# Patient Record
Sex: MALE | Race: OTHER | NOT HISPANIC OR LATINO | URBAN - METROPOLITAN AREA
[De-identification: names, ages, dates, MRNs, and addresses within clinical notes are randomized per-mention and may not be internally consistent; named-entity substitution may affect disease eponyms.]

---

## 2018-06-09 ENCOUNTER — INPATIENT (INPATIENT)
Facility: HOSPITAL | Age: 28
LOS: 5 days | Discharge: HOME | End: 2018-06-15
Attending: PSYCHIATRY & NEUROLOGY | Admitting: PSYCHIATRY & NEUROLOGY
Payer: MEDICAID

## 2018-06-09 VITALS
SYSTOLIC BLOOD PRESSURE: 117 MMHG | OXYGEN SATURATION: 100 % | TEMPERATURE: 98 F | DIASTOLIC BLOOD PRESSURE: 77 MMHG | HEART RATE: 92 BPM | RESPIRATION RATE: 18 BRPM

## 2018-06-09 DIAGNOSIS — F29 UNSPECIFIED PSYCHOSIS NOT DUE TO A SUBSTANCE OR KNOWN PHYSIOLOGICAL CONDITION: ICD-10-CM

## 2018-06-09 DIAGNOSIS — F84.0 AUTISTIC DISORDER: ICD-10-CM

## 2018-06-09 LAB
ALBUMIN SERPL ELPH-MCNC: 4.9 G/DL — SIGNIFICANT CHANGE UP (ref 3.3–5)
ALP SERPL-CCNC: 106 U/L — SIGNIFICANT CHANGE UP (ref 40–120)
ALT FLD-CCNC: 11 U/L — SIGNIFICANT CHANGE UP (ref 4–41)
AMPHET UR-MCNC: NEGATIVE — SIGNIFICANT CHANGE UP
APAP SERPL-MCNC: < 15 UG/ML — LOW (ref 15–25)
APPEARANCE UR: CLEAR — SIGNIFICANT CHANGE UP
AST SERPL-CCNC: 14 U/L — SIGNIFICANT CHANGE UP (ref 4–40)
BARBITURATES UR SCN-MCNC: NEGATIVE — SIGNIFICANT CHANGE UP
BASOPHILS # BLD AUTO: 0.04 K/UL — SIGNIFICANT CHANGE UP (ref 0–0.2)
BASOPHILS NFR BLD AUTO: 0.5 % — SIGNIFICANT CHANGE UP (ref 0–2)
BENZODIAZ UR-MCNC: NEGATIVE — SIGNIFICANT CHANGE UP
BILIRUB SERPL-MCNC: 0.6 MG/DL — SIGNIFICANT CHANGE UP (ref 0.2–1.2)
BILIRUB UR-MCNC: NEGATIVE — SIGNIFICANT CHANGE UP
BLOOD UR QL VISUAL: HIGH
BUN SERPL-MCNC: 10 MG/DL — SIGNIFICANT CHANGE UP (ref 7–23)
CALCIUM SERPL-MCNC: 9.5 MG/DL — SIGNIFICANT CHANGE UP (ref 8.4–10.5)
CANNABINOIDS UR-MCNC: NEGATIVE — SIGNIFICANT CHANGE UP
CHLORIDE SERPL-SCNC: 102 MMOL/L — SIGNIFICANT CHANGE UP (ref 98–107)
CO2 SERPL-SCNC: 25 MMOL/L — SIGNIFICANT CHANGE UP (ref 22–31)
COCAINE METAB.OTHER UR-MCNC: NEGATIVE — SIGNIFICANT CHANGE UP
COLOR SPEC: YELLOW — SIGNIFICANT CHANGE UP
CREAT SERPL-MCNC: 0.87 MG/DL — SIGNIFICANT CHANGE UP (ref 0.5–1.3)
EOSINOPHIL # BLD AUTO: 0.04 K/UL — SIGNIFICANT CHANGE UP (ref 0–0.5)
EOSINOPHIL NFR BLD AUTO: 0.5 % — SIGNIFICANT CHANGE UP (ref 0–6)
ETHANOL BLD-MCNC: < 10 MG/DL — SIGNIFICANT CHANGE UP
GLUCOSE SERPL-MCNC: 104 MG/DL — HIGH (ref 70–99)
GLUCOSE UR-MCNC: NEGATIVE — SIGNIFICANT CHANGE UP
HCT VFR BLD CALC: 44.1 % — SIGNIFICANT CHANGE UP (ref 39–50)
HGB BLD-MCNC: 15.2 G/DL — SIGNIFICANT CHANGE UP (ref 13–17)
IMM GRANULOCYTES # BLD AUTO: 0.02 # — SIGNIFICANT CHANGE UP
IMM GRANULOCYTES NFR BLD AUTO: 0.3 % — SIGNIFICANT CHANGE UP (ref 0–1.5)
KETONES UR-MCNC: NEGATIVE — SIGNIFICANT CHANGE UP
LEUKOCYTE ESTERASE UR-ACNC: NEGATIVE — SIGNIFICANT CHANGE UP
LYMPHOCYTES # BLD AUTO: 1.58 K/UL — SIGNIFICANT CHANGE UP (ref 1–3.3)
LYMPHOCYTES # BLD AUTO: 21.6 % — SIGNIFICANT CHANGE UP (ref 13–44)
MCHC RBC-ENTMCNC: 29.5 PG — SIGNIFICANT CHANGE UP (ref 27–34)
MCHC RBC-ENTMCNC: 34.5 % — SIGNIFICANT CHANGE UP (ref 32–36)
MCV RBC AUTO: 85.6 FL — SIGNIFICANT CHANGE UP (ref 80–100)
METHADONE UR-MCNC: NEGATIVE — SIGNIFICANT CHANGE UP
MONOCYTES # BLD AUTO: 0.39 K/UL — SIGNIFICANT CHANGE UP (ref 0–0.9)
MONOCYTES NFR BLD AUTO: 5.3 % — SIGNIFICANT CHANGE UP (ref 2–14)
MUCOUS THREADS # UR AUTO: SIGNIFICANT CHANGE UP
NEUTROPHILS # BLD AUTO: 5.26 K/UL — SIGNIFICANT CHANGE UP (ref 1.8–7.4)
NEUTROPHILS NFR BLD AUTO: 71.8 % — SIGNIFICANT CHANGE UP (ref 43–77)
NITRITE UR-MCNC: NEGATIVE — SIGNIFICANT CHANGE UP
NRBC # FLD: 0 — SIGNIFICANT CHANGE UP
OPIATES UR-MCNC: NEGATIVE — SIGNIFICANT CHANGE UP
OXYCODONE UR-MCNC: NEGATIVE — SIGNIFICANT CHANGE UP
PCP UR-MCNC: NEGATIVE — SIGNIFICANT CHANGE UP
PH UR: 7 — SIGNIFICANT CHANGE UP (ref 4.6–8)
PLATELET # BLD AUTO: 213 K/UL — SIGNIFICANT CHANGE UP (ref 150–400)
PMV BLD: 10.9 FL — SIGNIFICANT CHANGE UP (ref 7–13)
POTASSIUM SERPL-MCNC: 4.2 MMOL/L — SIGNIFICANT CHANGE UP (ref 3.5–5.3)
POTASSIUM SERPL-SCNC: 4.2 MMOL/L — SIGNIFICANT CHANGE UP (ref 3.5–5.3)
PROT SERPL-MCNC: 8 G/DL — SIGNIFICANT CHANGE UP (ref 6–8.3)
PROT UR-MCNC: 10 MG/DL — SIGNIFICANT CHANGE UP
RBC # BLD: 5.15 M/UL — SIGNIFICANT CHANGE UP (ref 4.2–5.8)
RBC # FLD: 11.6 % — SIGNIFICANT CHANGE UP (ref 10.3–14.5)
RBC CASTS # UR COMP ASSIST: HIGH (ref 0–?)
SALICYLATES SERPL-MCNC: < 5 MG/DL — LOW (ref 15–30)
SODIUM SERPL-SCNC: 141 MMOL/L — SIGNIFICANT CHANGE UP (ref 135–145)
SP GR SPEC: 1.02 — SIGNIFICANT CHANGE UP (ref 1–1.04)
SQUAMOUS # UR AUTO: SIGNIFICANT CHANGE UP
TSH SERPL-MCNC: 1.01 UIU/ML — SIGNIFICANT CHANGE UP (ref 0.27–4.2)
UROBILINOGEN FLD QL: 1 MG/DL — SIGNIFICANT CHANGE UP
WBC # BLD: 7.33 K/UL — SIGNIFICANT CHANGE UP (ref 3.8–10.5)
WBC # FLD AUTO: 7.33 K/UL — SIGNIFICANT CHANGE UP (ref 3.8–10.5)
WBC UR QL: SIGNIFICANT CHANGE UP (ref 0–?)

## 2018-06-09 PROCEDURE — 99285 EMERGENCY DEPT VISIT HI MDM: CPT | Mod: GC

## 2018-06-09 RX ORDER — DIPHENHYDRAMINE HCL 50 MG
50 CAPSULE ORAL EVERY 6 HOURS
Qty: 0 | Refills: 0 | Status: DISCONTINUED | OUTPATIENT
Start: 2018-06-09 | End: 2018-06-15

## 2018-06-09 RX ORDER — HALOPERIDOL DECANOATE 100 MG/ML
5 INJECTION INTRAMUSCULAR EVERY 6 HOURS
Qty: 0 | Refills: 0 | Status: DISCONTINUED | OUTPATIENT
Start: 2018-06-09 | End: 2018-06-15

## 2018-06-09 RX ORDER — HALOPERIDOL DECANOATE 100 MG/ML
5 INJECTION INTRAMUSCULAR ONCE
Qty: 0 | Refills: 0 | Status: DISCONTINUED | OUTPATIENT
Start: 2018-06-09 | End: 2018-06-15

## 2018-06-09 RX ORDER — OLANZAPINE 15 MG/1
2.5 TABLET, FILM COATED ORAL AT BEDTIME
Qty: 0 | Refills: 0 | Status: DISCONTINUED | OUTPATIENT
Start: 2018-06-09 | End: 2018-06-11

## 2018-06-09 RX ADMIN — OLANZAPINE 2.5 MILLIGRAM(S): 15 TABLET, FILM COATED ORAL at 23:10

## 2018-06-09 NOTE — ED ADULT TRIAGE NOTE - CHIEF COMPLAINT QUOTE
pt brought in by ems after mom caljm/ pt  has displayed paranoid behavior.  being  treated with zoloft but not taking med/  c/o burning on head/  thinks govt spying on him.
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
## 2018-06-09 NOTE — ED BEHAVIORAL HEALTH ASSESSMENT NOTE - OTHER PAST PSYCHIATRIC HISTORY (INCLUDE DETAILS REGARDING ONSET, COURSE OF ILLNESS, INPATIENT/OUTPATIENT TREATMENT)
multiple inpatient psychiatric hospitalizations, Patient was diagnosed with Asperger's Syndrome -10yo and saw a therapist through his teens for improving socialization and reading. His behavior started to worsen at age 17 when he stopped therapy. Has had incomplete medication trials of Zoloft, risperdal and   Abilify. Two recent hospitalizations in [facility] ~ 2 years ago, two other prior psychiatric hospitalizations at [facility] in 2011 and [place] [id number] in 2012. No history of suicide attempts. History of aggression toward mother and brother. multiple inpatient psychiatric hospitalizations, Patient was diagnosed with Asperger's Syndrome -10yo and saw a therapist through his teens for improving socialization and reading. His behavior started to worsen at age 17 when he stopped therapy. Has had incomplete medication trials of Zoloft, Risperdal and   Abilify. Two recent hospitalizations in [facility] ~ 2 years ago, two other prior psychiatric hospitalizations at [facility] in 2011 and [place] [id number] in 2012. No history of suicide attempts. History of aggression toward mother and brother.

## 2018-06-09 NOTE — ED BEHAVIORAL HEALTH ASSESSMENT NOTE - PAST PSYCHOTROPIC MEDICATION
Abilify, Risperdal, Seroquel, Zoloft (patient always gets on medications during Q 6 month hospitalizations, then becomes non-compliant)
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
## 2018-06-09 NOTE — ED BEHAVIORAL HEALTH ASSESSMENT NOTE - DESCRIPTION
born prematurely; none currently reported by patient graduated from [employer] (struggled somewhat), states his 36 yo brother lives in [place] and patient has a good relationship with him calm, cooperative, odd affect, at times clearing throat, intermittently looking around room    Vital Signs Last 24 Hrs  T(C): 36.7 (09 Jun 2018 19:53), Max: 36.7 (09 Jun 2018 19:53)  T(F): 98 (09 Jun 2018 19:53), Max: 98 (09 Jun 2018 19:53)  HR: 92 (09 Jun 2018 19:53) (92 - 92)  BP: 117/77 (09 Jun 2018 19:53) (117/77 - 117/77)  BP(mean): --  RR: 18 (09 Jun 2018 19:53) (18 - 18)  SpO2: 100% (09 Jun 2018 19:53) (100% - 100%)

## 2018-06-09 NOTE — ED BEHAVIORAL HEALTH ASSESSMENT NOTE - DETAILS
excessive grogginess/sedation on Risperdal/Abilify paternal side-mental illness; father and grandfather (and brother as per patient) have depression. father and some siblings have ETOH abuse and "serious" anger control issues Patient's mother is aware burning felt on temporal region reports being allergic to trees has history of Dr. Bansal given handoff at [id number]

## 2018-06-09 NOTE — ED PROVIDER NOTE - CARE PLAN
Principal Discharge DX:	Psychosis, unspecified psychosis type  Secondary Diagnosis:	Autism spectrum disorder

## 2018-06-09 NOTE — ED BEHAVIORAL HEALTH ASSESSMENT NOTE - PSYCHIATRIC ISSUES AND PLAN (INCLUDE STANDING AND PRN MEDICATION)
Haldol 5 mg/Ativan 2 mg/Benadryl 50 mg PO/IM Q 6 hr PRN for agitation/anxiety/EPS prophylaxis Haldol 5 mg/Ativan 2 mg/Benadryl 50 mg PO/IM Q 6 hr PRN for agitation/anxiety/EPS prophylaxis, olanzapine 2.5 mg PO QHS  (goal of up-titration if patient tolerates for paranoia as patient reports never having been on it before; if given IM emergently please do not within hours of givign Ativan IM for risks of respiratory issues); if depressive/anxiety symptoms persist, consider Zoloft, or hydroxyzine PRN for anxiety

## 2018-06-09 NOTE — ED ADULT NURSE NOTE - OBJECTIVE STATEMENT
Break coverage- Pt received to  at this time. As per EMS, mom called stating pt has been paranoid stating that someone is watching him. Pt states he feels his phone is being tapped and that people are setting up antennas outside of his apartment. Pt denies SI/HI at this time. Denies a/v hallucinations. Calm and cooperative. Awaiting Medical/Psych eval from providers. no acute distress. Awaiting further plan of care. Break coverage- Pt received to  at this time. As per EMS, mom called stating pt has been paranoid stating that someone is watching him. Pt states he feels his phone is being tapped and that people are setting up antennas outside of his apartment. Pt denies SI/HI at this time. Denies a/v hallucinations. Pt appears anxious. Calm and cooperative. Awaiting Medical/Psych eval from providers. no acute distress. Awaiting further plan of care.
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
## 2018-06-09 NOTE — ED BEHAVIORAL HEALTH ASSESSMENT NOTE - RISK ASSESSMENT
Acute risk factors include  Chronic risk factors include  Protective factors include Acute risk factors include recent exacerbation of paranoia, reported insomnia, anxiety, non-compliance with treatment, reported substance use (marijuana to other provider), poor insight into illness.  Chronic risk factors include genetic loading, premature birth, chronic delusions/perseveration in light of autism spectrum disorder/PDD. Protective factors include no SI/HI or AVH, some tactile sensations of burning, no suicide attempts, supportive family. At this time, patient's risk factors outweigh protective factors and as such warrant inpatient psychiatric admission for safety, stabilization, and medication management.

## 2018-06-09 NOTE — ED BEHAVIORAL HEALTH NOTE - BEHAVIORAL HEALTH NOTE
Collateral information obtained from Pt Mom Shanel Bauer:   Pt mom was called on [phone] – she stated that pt started having paranoid delusions around 1st grade, he would frequently complain about people trying to poison him, he did not get along with most of his peers at school at that time. By age 6 he was diagnosed with PDD and reportedly did poorly academically throughout grade school. He however was not treated with medications during this time but was seeing a therapist. However by age 17 his symptoms got worse as he started suspecting his family of wanting to poison him as he would frequently demand that they taste his food before he eats. He also would jump over imaginary objects claiming that cars were trying to run him over. Mom stated that he has been hospitalized multiple timed since he turned 19. Last hospitalization was in [facility] 2 years where he was hospitalized for 10 days. He has been hospitalized twice at [facility]. His symptoms typically respond to medications while he is hospitalized but he refuses to follow up or take meds post d/c.    Pt mom indicated that pt has been getting aggressive especially with members of his family especially his sister and his mother whom he grabs aggressively and threatens periodically. He reportedly threatened to kill his mom if she got him hospitalized again.   Over the last 2 weeks pt mom stated that he has been more paranoid than usual, he has been stating that Marko ellis planted a virus in his computer to watch him, he believes they Israelis are going to destroy [place] within a week, repeatedly yelling “we are all dead” and has been requesting that they move to [place] to talk to the government about it. He has also been saying that the spies penetrated his body and planted a virus in his brain to control and destroy him. Collateral information obtained from Pt Mom Shanel Bauer:   Pt mom was called on [phone] – she stated that pt started having paranoid delusions around 1st grade, he would frequently complain about people trying to poison him, he did not get along with most of his peers at school at that time. By age 6 he was diagnosed with PDD and reportedly did poorly academically throughout grade school. He however was not treated with medications during this time but was seeing a therapist. However by age 17 his symptoms got worse as he started suspecting his family of wanting to poison him as he would frequently demand that they taste his food before he eats. He also would jump over imaginary objects claiming that cars were trying to run him over. Mom stated that he has been hospitalized multiple timed since he turned 19. Last hospitalization was in [facility] 2 years where he was hospitalized for 10 days. He has been hospitalized twice at [facility]. His symptoms typically respond to medications while he is hospitalized but he refuses to follow up or take meds post d/c.    Pt mom indicated that pt has been getting aggressive especially with members of his family especially his sister and his mother whom he grabs aggressively and threatens periodically. He reportedly threatened to kill his mom if she got him hospitalized again.   Over the last 2 weeks pt mom stated that he has been more paranoid than usual, he has been stating that Marko ellis planted a virus in his computer to watch him, he believes they Israelis are going to destroy [place] within a week, repeatedly yelling “we are all dead” and has been requesting that they move to [place] to talk to the government about it. He has also been saying that the spies penetrated his body and planted a virus in his brain to control and destroy him.    Attending addendum:   Collateral information was obtained from Pt's mother as above. As per mother, Pt has been more delusional and paranoid than usual for the past several weeks.

## 2018-06-09 NOTE — ED ADULT NURSE NOTE - NSSISCREENINGQ1_ED_A_ED
Small bore nasal duodenal feeding tube inserted to 83 cm at the right nare.  Placed with Coretrak without issues.   No

## 2018-06-09 NOTE — ED PROVIDER NOTE - MEDICAL DECISION MAKING DETAILS
28 y/o M hx PDD, Anxiety D/O  Labs, Urine Tox/UA, EKG.   Medical evaluation performed. There is no clinical evidence of intoxication or any acute medical problem requiring immediate intervention. Patient is awaiting psychiatric consultation. Final disposition will be determined by psychiatrist.

## 2018-06-10 PROCEDURE — 99222 1ST HOSP IP/OBS MODERATE 55: CPT

## 2018-06-10 RX ADMIN — OLANZAPINE 2.5 MILLIGRAM(S): 15 TABLET, FILM COATED ORAL at 20:30

## 2018-06-10 NOTE — ED BEHAVIORAL HEALTH NOTE - BEHAVIORAL HEALTH NOTE
Writer called [employer], [phone], to request authorization for  mental healthcare, and spoke with Annabel, who registered the case. Writer was transferred to Christel, who provided authorization # [id number], for 6/9/18-6/16/18, with concurrent review due on Friday, the day before the last covered day, 6/15/18, with staff member to be determined.

## 2018-06-11 PROCEDURE — 99232 SBSQ HOSP IP/OBS MODERATE 35: CPT

## 2018-06-11 RX ORDER — OLANZAPINE 15 MG/1
5 TABLET, FILM COATED ORAL AT BEDTIME
Qty: 0 | Refills: 0 | Status: DISCONTINUED | OUTPATIENT
Start: 2018-06-11 | End: 2018-06-15

## 2018-06-11 RX ADMIN — OLANZAPINE 5 MILLIGRAM(S): 15 TABLET, FILM COATED ORAL at 21:31

## 2018-06-12 PROCEDURE — 99232 SBSQ HOSP IP/OBS MODERATE 35: CPT

## 2018-06-12 RX ADMIN — OLANZAPINE 5 MILLIGRAM(S): 15 TABLET, FILM COATED ORAL at 21:03

## 2018-06-13 PROCEDURE — 99232 SBSQ HOSP IP/OBS MODERATE 35: CPT

## 2018-06-13 RX ADMIN — OLANZAPINE 5 MILLIGRAM(S): 15 TABLET, FILM COATED ORAL at 21:46

## 2018-06-14 PROCEDURE — 99232 SBSQ HOSP IP/OBS MODERATE 35: CPT

## 2018-06-14 RX ORDER — OLANZAPINE 15 MG/1
1 TABLET, FILM COATED ORAL
Qty: 30 | Refills: 0 | OUTPATIENT
Start: 2018-06-14

## 2018-06-14 RX ADMIN — OLANZAPINE 5 MILLIGRAM(S): 15 TABLET, FILM COATED ORAL at 20:56

## 2018-06-15 ENCOUNTER — EMERGENCY (EMERGENCY)
Facility: HOSPITAL | Age: 28
LOS: 1 days | Discharge: HOME | End: 2018-06-15
Admitting: EMERGENCY MEDICINE
Payer: MEDICAID

## 2018-06-15 VITALS
DIASTOLIC BLOOD PRESSURE: 82 MMHG | TEMPERATURE: 98 F | RESPIRATION RATE: 18 BRPM | HEART RATE: 77 BPM | OXYGEN SATURATION: 98 % | SYSTOLIC BLOOD PRESSURE: 127 MMHG

## 2018-06-15 VITALS — TEMPERATURE: 98 F | HEART RATE: 92 BPM | SYSTOLIC BLOOD PRESSURE: 147 MMHG | DIASTOLIC BLOOD PRESSURE: 75 MMHG

## 2018-06-15 DIAGNOSIS — F84.0 AUTISTIC DISORDER: ICD-10-CM

## 2018-06-15 PROCEDURE — 90792 PSYCH DIAG EVAL W/MED SRVCS: CPT

## 2018-06-15 PROCEDURE — 99238 HOSP IP/OBS DSCHRG MGMT 30/<: CPT

## 2018-06-15 PROCEDURE — 99284 EMERGENCY DEPT VISIT MOD MDM: CPT

## 2018-06-15 RX ORDER — OLANZAPINE 15 MG/1
5 TABLET, FILM COATED ORAL ONCE
Qty: 0 | Refills: 0 | Status: COMPLETED | OUTPATIENT
Start: 2018-06-15 | End: 2018-06-15

## 2018-06-15 RX ADMIN — OLANZAPINE 5 MILLIGRAM(S): 15 TABLET, FILM COATED ORAL at 22:07

## 2018-06-15 NOTE — ED ADULT TRIAGE NOTE - CHIEF COMPLAINT QUOTE
Pt brought in by EMS from home for verbal altercation with his mom. Pt denies SI/HI. pt brought back to [facility]

## 2018-06-15 NOTE — ED PROVIDER NOTE - MEDICAL DECISION MAKING DETAILS
28 y/o M hx PDD, Anxiety D/O  Medical evaluation performed. There is no clinical evidence of intoxication or any acute medical problem requiring immediate intervention. 28 y/o M hx PDD, Anxiety D/O  Medical evaluation performed. There is no clinical evidence of intoxication or any acute medical problem requiring immediate intervention. Patient is awaiting psychiatric consultation. Final disposition will be determined by psychiatrist.

## 2018-06-15 NOTE — ED BEHAVIORAL HEALTH ASSESSMENT NOTE - SUMMARY
This is a 27 year old Kazakh-American male, domiciled alone, no dependents, graduate of [employer] (history degree, 2014), no reported history of legal issues/arrests, multiple previous inpatient psychiatric hospitalizations (most recently in [facility] for 10 days, previously at [facility] twice in teens when patient was verbally aggressive, throwing items at mom), psychiatric history of Asperger's (Autism spectrum disorder) PDD NOS, episodes of paranoia/impulse control disorder, no reported substance abuse, no current PMH reported though patient seems somatically preoccupied, presenting today after mother called 911 after an argument in the car on the way home from the hospital.    Patient presents today following a verbal argument with mother post-hospital discharge. There is no evidence of acute mood or psychotic symptoms on exam. Patient admits to feeling angry earlier but denies any suicidal/homicidal/violent ideation. There is no evidence of formal thought disorder on exam, patient is not internally preoccupied nor responding to internal stimuli.    Patient is agreeable to take tonight's medication in the ED and is agreeable to follow up with outpatient psychiatrist next week.

## 2018-06-15 NOTE — ED BEHAVIORAL HEALTH ASSESSMENT NOTE - DETAILS
paternal side-mental illness; father and grandfather (and brother as per patient) have depression. father and some siblings have ETOH abuse and "serious" anger control issues has history of excessive grogginess/sedation on Risperdal/Abilify mother contacted, informed of dispo by JJ discharged today Yashira Mcmahon

## 2018-06-15 NOTE — ED BEHAVIORAL HEALTH NOTE - BEHAVIORAL HEALTH NOTE
Writer called [facility], [phone], and spoke with Jono, who provided invoice # [id number], for [facility] service, to be provided by [employer], [phone], with an ETA of 9PM, to transport him to his address, verified to be: [address] [place] [address], phone [phone], with an ETA of 9PM. Writer called the patient's mother, Shanel Bauer, [phone], and informed her of same.  called [facility], [phone], and spoke with Jono, who provided invoice # [id number], for liver service, to be provided by [employer], [phone], with an ETA of 9PM, to transport him to his address, verified to be: [address] [place] [address], phone [phone], with an ETA of 9PM.  called the patient's mother, Shanel Bauer, [phone], and informed her of same. At 9:40,  called [employer], and was told that the dispatcher called the patient's home, which was answered by patient's mother, [phone], who told the dispatcher that the transportation was not needed.  reinstated the request for the cab, and was told attempts would be made to have a cab arrive by 10:45 PM.
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
## 2018-06-15 NOTE — ED ADULT NURSE NOTE - OBJECTIVE STATEMENT
Pt w/ hx of pervasive developmental disorder recently discharged from  after 6 day stay during which Pt c/o throbbing headache received to low acuity  area aaox4 ambulatory anxious.  Per Pt He was arguing with his mother and demanding an appointment with his doctor ().  Pt denies SI HI depression ,violence or aggression towards his mother or anyone else.  Pt p/w normal mentation,  anxiety NAD breaths even unlabored skin warm dry intact.

## 2018-06-15 NOTE — ED BEHAVIORAL HEALTH ASSESSMENT NOTE - OTHER PAST PSYCHIATRIC HISTORY (INCLUDE DETAILS REGARDING ONSET, COURSE OF ILLNESS, INPATIENT/OUTPATIENT TREATMENT)
multiple inpatient psychiatric hospitalizations, most recently discharged today after one week, Patient was diagnosed with Asperger's Syndrome -8yo and saw a therapist through his teens for improving socialization and reading. His behavior started to worsen at age 17 when he stopped therapy. Has had incomplete medication trials of Zoloft, Risperdal and   Abilify. Two recent hospitalizations in [facility] ~ 2 years ago, two other prior psychiatric hospitalizations at [facility] in 2011 and [place] [address] in 2012. No history of suicide attempts. History of aggression toward mother and brother.

## 2018-06-15 NOTE — ED BEHAVIORAL HEALTH ASSESSMENT NOTE - DESCRIPTION
graduated from [employer] (struggled somewhat), states his 36 yo brother lives in [place] and patient has a good relationship with him born prematurely; none currently reported by patient calm and cooperative, pleasant and appropriate.

## 2018-06-15 NOTE — ED PROVIDER NOTE - OBJECTIVE STATEMENT
28 y/o M hx PDD, Anxiety D/O BIBA w c/o agitation secondary to verbal altercation with his mother. Denies any physical altercation.  Denies SI/HI/AH/VH. Denies  falling , punching or kicking any objects. Denies pain, SOB, fever, chills, chest/ abdominal discomfort.   Denies recent use of  alcohol or illicit drugs.

## 2018-06-15 NOTE — ED BEHAVIORAL HEALTH ASSESSMENT NOTE - RISK ASSESSMENT
Acute risk factors include hospital discharge. Chronic risk factors include genetic loading, premature birth, chronic delusions/perseveration in light of autism spectrum disorder/PDD. Protective factors include current treatment compliance, no SI/HI, no hallucinations or delusions, no suicide attempts, supportive family. No indication that patient is at elevated imminent risk at this time, chronic risks are not likely to be mitigated by another acute inpatient psychiatric hospitalization.

## 2018-06-15 NOTE — ED BEHAVIORAL HEALTH ASSESSMENT NOTE - REFERRAL / APPOINTMENT DETAILS
Intake appointment at [facility] with Dr. Leslie on 6/22/18 at 1pm ([phone]) for medication management. [facility] program on 6/18/18 at 12:45pm with MARA Carbone ([phone])

## 2018-06-16 PROBLEM — F84.9 PERVASIVE DEVELOPMENTAL DISORDER, UNSPECIFIED: Chronic | Status: ACTIVE | Noted: 2018-06-09

## 2018-06-16 PROBLEM — F41.9 ANXIETY DISORDER, UNSPECIFIED: Chronic | Status: ACTIVE | Noted: 2018-06-09

## 2018-06-22 ENCOUNTER — OUTPATIENT (OUTPATIENT)
Dept: OUTPATIENT SERVICES | Facility: HOSPITAL | Age: 28
LOS: 1 days | Discharge: HOME | End: 2018-06-22
Payer: MEDICAID

## 2018-06-22 PROCEDURE — 90792 PSYCH DIAG EVAL W/MED SRVCS: CPT

## 2018-06-25 DIAGNOSIS — F84.0 AUTISTIC DISORDER: ICD-10-CM

## 2018-09-11 ENCOUNTER — INPATIENT (INPATIENT)
Facility: HOSPITAL | Age: 28
LOS: 22 days | Discharge: HOME | End: 2018-10-04
Attending: PSYCHIATRY & NEUROLOGY | Admitting: PSYCHIATRY & NEUROLOGY
Payer: MEDICAID

## 2018-09-11 VITALS
DIASTOLIC BLOOD PRESSURE: 87 MMHG | RESPIRATION RATE: 18 BRPM | SYSTOLIC BLOOD PRESSURE: 127 MMHG | TEMPERATURE: 99 F | OXYGEN SATURATION: 98 % | HEART RATE: 115 BPM

## 2018-09-11 DIAGNOSIS — F84.5 ASPERGER'S SYNDROME: ICD-10-CM

## 2018-09-11 DIAGNOSIS — F29 UNSPECIFIED PSYCHOSIS NOT DUE TO A SUBSTANCE OR KNOWN PHYSIOLOGICAL CONDITION: ICD-10-CM

## 2018-09-11 DIAGNOSIS — R69 ILLNESS, UNSPECIFIED: ICD-10-CM

## 2018-09-11 LAB
ALBUMIN SERPL ELPH-MCNC: 4.8 G/DL — SIGNIFICANT CHANGE UP (ref 3.3–5)
ALP SERPL-CCNC: 72 U/L — SIGNIFICANT CHANGE UP (ref 40–120)
ALT FLD-CCNC: 18 U/L — SIGNIFICANT CHANGE UP (ref 4–41)
APAP SERPL-MCNC: < 15 UG/ML — LOW (ref 15–25)
AST SERPL-CCNC: 15 U/L — SIGNIFICANT CHANGE UP (ref 4–40)
BASOPHILS # BLD AUTO: 0.05 K/UL — SIGNIFICANT CHANGE UP (ref 0–0.2)
BASOPHILS NFR BLD AUTO: 0.7 % — SIGNIFICANT CHANGE UP (ref 0–2)
BILIRUB SERPL-MCNC: 0.6 MG/DL — SIGNIFICANT CHANGE UP (ref 0.2–1.2)
BUN SERPL-MCNC: 11 MG/DL — SIGNIFICANT CHANGE UP (ref 7–23)
CALCIUM SERPL-MCNC: 9.4 MG/DL — SIGNIFICANT CHANGE UP (ref 8.4–10.5)
CHLORIDE SERPL-SCNC: 99 MMOL/L — SIGNIFICANT CHANGE UP (ref 98–107)
CO2 SERPL-SCNC: 22 MMOL/L — SIGNIFICANT CHANGE UP (ref 22–31)
CREAT SERPL-MCNC: 0.7 MG/DL — SIGNIFICANT CHANGE UP (ref 0.5–1.3)
EOSINOPHIL # BLD AUTO: 0.09 K/UL — SIGNIFICANT CHANGE UP (ref 0–0.5)
EOSINOPHIL NFR BLD AUTO: 1.2 % — SIGNIFICANT CHANGE UP (ref 0–6)
ETHANOL BLD-MCNC: < 10 MG/DL — SIGNIFICANT CHANGE UP
GLUCOSE SERPL-MCNC: 106 MG/DL — HIGH (ref 70–99)
HCT VFR BLD CALC: 44.8 % — SIGNIFICANT CHANGE UP (ref 39–50)
HGB BLD-MCNC: 15.3 G/DL — SIGNIFICANT CHANGE UP (ref 13–17)
IMM GRANULOCYTES # BLD AUTO: 0.02 # — SIGNIFICANT CHANGE UP
IMM GRANULOCYTES NFR BLD AUTO: 0.3 % — SIGNIFICANT CHANGE UP (ref 0–1.5)
LYMPHOCYTES # BLD AUTO: 1.18 K/UL — SIGNIFICANT CHANGE UP (ref 1–3.3)
LYMPHOCYTES # BLD AUTO: 16 % — SIGNIFICANT CHANGE UP (ref 13–44)
MCHC RBC-ENTMCNC: 30 PG — SIGNIFICANT CHANGE UP (ref 27–34)
MCHC RBC-ENTMCNC: 34.2 % — SIGNIFICANT CHANGE UP (ref 32–36)
MCV RBC AUTO: 87.8 FL — SIGNIFICANT CHANGE UP (ref 80–100)
MONOCYTES # BLD AUTO: 0.52 K/UL — SIGNIFICANT CHANGE UP (ref 0–0.9)
MONOCYTES NFR BLD AUTO: 7 % — SIGNIFICANT CHANGE UP (ref 2–14)
NEUTROPHILS # BLD AUTO: 5.52 K/UL — SIGNIFICANT CHANGE UP (ref 1.8–7.4)
NEUTROPHILS NFR BLD AUTO: 74.8 % — SIGNIFICANT CHANGE UP (ref 43–77)
NRBC # FLD: 0 — SIGNIFICANT CHANGE UP
PLATELET # BLD AUTO: 222 K/UL — SIGNIFICANT CHANGE UP (ref 150–400)
PMV BLD: 10.2 FL — SIGNIFICANT CHANGE UP (ref 7–13)
POTASSIUM SERPL-MCNC: 4 MMOL/L — SIGNIFICANT CHANGE UP (ref 3.5–5.3)
POTASSIUM SERPL-SCNC: 4 MMOL/L — SIGNIFICANT CHANGE UP (ref 3.5–5.3)
PROT SERPL-MCNC: 7.8 G/DL — SIGNIFICANT CHANGE UP (ref 6–8.3)
RBC # BLD: 5.1 M/UL — SIGNIFICANT CHANGE UP (ref 4.2–5.8)
RBC # FLD: 11.7 % — SIGNIFICANT CHANGE UP (ref 10.3–14.5)
SALICYLATES SERPL-MCNC: < 5 MG/DL — LOW (ref 15–30)
SODIUM SERPL-SCNC: 136 MMOL/L — SIGNIFICANT CHANGE UP (ref 135–145)
TSH SERPL-MCNC: 1.44 UIU/ML — SIGNIFICANT CHANGE UP (ref 0.27–4.2)
WBC # BLD: 7.38 K/UL — SIGNIFICANT CHANGE UP (ref 3.8–10.5)
WBC # FLD AUTO: 7.38 K/UL — SIGNIFICANT CHANGE UP (ref 3.8–10.5)

## 2018-09-11 PROCEDURE — 71045 X-RAY EXAM CHEST 1 VIEW: CPT | Mod: 26

## 2018-09-11 PROCEDURE — 99285 EMERGENCY DEPT VISIT HI MDM: CPT | Mod: GC

## 2018-09-11 RX ORDER — HALOPERIDOL DECANOATE 100 MG/ML
5 INJECTION INTRAMUSCULAR EVERY 6 HOURS
Qty: 0 | Refills: 0 | Status: DISCONTINUED | OUTPATIENT
Start: 2018-09-12 | End: 2018-10-04

## 2018-09-11 RX ORDER — DIPHENHYDRAMINE HCL 50 MG
50 CAPSULE ORAL EVERY 6 HOURS
Qty: 0 | Refills: 0 | Status: DISCONTINUED | OUTPATIENT
Start: 2018-09-12 | End: 2018-10-04

## 2018-09-11 RX ORDER — DIPHENHYDRAMINE HCL 50 MG
50 CAPSULE ORAL ONCE
Qty: 0 | Refills: 0 | Status: DISCONTINUED | OUTPATIENT
Start: 2018-09-12 | End: 2018-10-04

## 2018-09-11 RX ORDER — OLANZAPINE 15 MG/1
5 TABLET, FILM COATED ORAL AT BEDTIME
Qty: 0 | Refills: 0 | Status: DISCONTINUED | OUTPATIENT
Start: 2018-09-12 | End: 2018-09-13

## 2018-09-11 RX ORDER — HALOPERIDOL DECANOATE 100 MG/ML
5 INJECTION INTRAMUSCULAR ONCE
Qty: 0 | Refills: 0 | Status: DISCONTINUED | OUTPATIENT
Start: 2018-09-12 | End: 2018-10-04

## 2018-09-11 NOTE — ED BEHAVIORAL HEALTH ASSESSMENT NOTE - DIFFERENTIAL
Asperger's, Autism spectrum disorder, R/O ALISHA Asperger's/Autism spectrum disorder, schizoaffective d/o Asperger's/Autism spectrum disorder, delusional d/o, schizoaffective d/o

## 2018-09-11 NOTE — ED BEHAVIORAL HEALTH ASSESSMENT NOTE - PSYCHIATRIC ISSUES AND PLAN (INCLUDE STANDING AND PRN MEDICATION)
Will restart olanzapine 5mg qhs in light of previous efficacy Will restart olanzapine 5mg qhs in light of previous efficacy, Haldol 5/Ativan 2/Benadryl 50 IM and PO PRN for agitation

## 2018-09-11 NOTE — ED BEHAVIORAL HEALTH ASSESSMENT NOTE - OTHER PAST PSYCHIATRIC HISTORY (INCLUDE DETAILS REGARDING ONSET, COURSE OF ILLNESS, INPATIENT/OUTPATIENT TREATMENT)
Multiple inpatient psychiatric hospitalizations, most recently discharged from [facility] in June 2018. Patient was diagnosed with Asperger's Syndrome when 9 y/o and saw a therapist through his teens for improving socialization and reading. His behavior started to worsen at age 17 when he stopped therapy. Has had incomplete medication trials of Zoloft, Risperdal and Abilify. Two recent hospitalizations in [facility] ~ 2 years ago, two other prior psychiatric hospitalizations at [facility] in 2011 and [place] [address] in 2012. No history of suicide attempts. History of aggression toward mother and brother. Multiple inpatient psychiatric hospitalizations, most recently discharged from [facility] in June 2018. Patient was diagnosed with Asperger's Syndrome when 9 y/o and saw a therapist through his teens for improving socialization and reading. His behavior started to worsen at age 17 when he stopped therapy. No history of suicide attempts. History of aggression toward mother and brother.

## 2018-09-11 NOTE — ED BEHAVIORAL HEALTH ASSESSMENT NOTE - RISK ASSESSMENT
Acute risk factors include hospital discharge. Chronic risk factors include genetic loading, premature birth, chronic delusions/perseveration in light of autism spectrum disorder/PDD. Protective factors include current treatment compliance, no SI/HI, no hallucinations or delusions, no suicide attempts, supportive family. No indication that patient is at elevated imminent risk at this time, chronic risks are not likely to be mitigated by another acute inpatient psychiatric hospitalization. Patient has some protective factors including stability of domicile. However, he has multiple risk factors including medication non-compliance, male gender, history of past psychiatric hospitalizations, and severe anxiety 2/2 paranoia. He is currently at moderate to high risk of hurting himself and/or others at this time. He requires emergent inpatient hospitalization in the context of this acutely elevated risk.

## 2018-09-11 NOTE — ED PROVIDER NOTE - MEDICAL DECISION MAKING DETAILS
This is a 27 year old Male PMHX Asperger's  BIB family for psych eval r/t mediation noncompliance. On arrival patient states "There is something in my lungs, something is growing in there Medical evaluation performed. There is no clinical evidence of intoxication or any acute medical problem requiring immediate intervention. Final disposition will be determined by psychiatrist.

## 2018-09-11 NOTE — ED BEHAVIORAL HEALTH ASSESSMENT NOTE - CASE SUMMARY
28 y/o Spanish-American male, domiciled alone, no dependents, graduate of [employer] (history degree, 2014), no reported history of legal issues/arrests, multiple previous inpatient psychiatric hospitalizations (most recently on [facility] in June 2018 for 6 days, previously at [facility] twice in teens when patient was verbally aggressive and throwing items at mom), psychiatric dx of Asperger's (Autism spectrum disorder) PDD NOS, episodes of paranoia/impulse control disorder, no reported substance abuse, no current PMH reported though patient is somatically preoccupied, presenting today complaining of his lungs malfunctioning. Patient reports continued paranoid delusions of being followed and poisoned. He is notably anxious about this though he denies SI/HI. However, as per collateral from mom patient is very distressed by his somatic delusions and reported to her that he plans to kill himself as he feels his organs are all failing him. Patient is also reported to be non-compliant w/ olanzapine in the context of cross-titration from Abilify to olanzapine.  Agree with the assessment and plan as outlined in resident note. Pt currently presenting with psychotic sx and paranoia including recent making homicidal comments. Base on the evaluation and history provided pt is at potentially  high risk of imminent danger to others and self and will require inpatient admission for safety and stabilization.

## 2018-09-11 NOTE — ED PROVIDER NOTE - NS ED ROS FT
Denies chest pain, SOB, N/V/D and fevers, Denies palpitations or diaphoresis. Denies Numbness, Tingling, Blurry Vision and HA.   Denies recent falls, trauma and injuries. Denies pain or any other medical complaints.      Denies cough

## 2018-09-11 NOTE — ED BEHAVIORAL HEALTH ASSESSMENT NOTE - DETAILS
has history of aggression paternal side-mental illness; father and grandfather (and brother as per patient) have depression. father and some siblings have ETOH abuse and "serious" anger control issues Discussed w/ OTTONIEL Discussed w/ patient's mother

## 2018-09-11 NOTE — ED PROVIDER NOTE - CARE PLAN
Principal Discharge DX:	Aspergers' syndrome  Secondary Diagnosis:	Deferred condition on axis II  Secondary Diagnosis:	Psychosis

## 2018-09-11 NOTE — ED PROVIDER NOTE - OBJECTIVE STATEMENT
This is a 27 year old Male PMHX    BIBIA for psych eval r/t medcation noncomplaince. Ther is something in my lungs This is a 27 year old Male PMHX Asperger's  BIB family for psych eval r/t mediation noncompliance. On arrival patient states "There is something in my lungs, something is growing in there" Patient appears paranoid, and thougth blocked. Denies SI/HI Denies AH/VH Denies ETOH/Illicit drugs

## 2018-09-11 NOTE — ED BEHAVIORAL HEALTH ASSESSMENT NOTE - SUMMARY
This is a 27 year old Persian-American male, domiciled alone, no dependents, graduate of [employer] (history degree, 2014), no reported history of legal issues/arrests, multiple previous inpatient psychiatric hospitalizations (most recently in [facility] for 10 days, previously at [facility] twice in teens when patient was verbally aggressive, throwing items at mom), psychiatric history of Asperger's (Autism spectrum disorder) PDD NOS, episodes of paranoia/impulse control disorder, no reported substance abuse, no current PMH reported though patient seems somatically preoccupied, presenting today after mother called 911 after an argument in the car on the way home from the hospital.    Patient presents today following a verbal argument with mother post-hospital discharge. There is no evidence of acute mood or psychotic symptoms on exam. Patient admits to feeling angry earlier but denies any suicidal/homicidal/violent ideation. There is no evidence of formal thought disorder on exam, patient is not internally preoccupied nor responding to internal stimuli.    Patient is agreeable to take tonight's medication in the ED and is agreeable to follow up with outpatient psychiatrist next week. 26 y/o Iraqi-American male, domiciled alone, no dependents, graduate of [employer] (history degree, 2014), no reported history of legal issues/arrests, multiple previous inpatient psychiatric hospitalizations (most recently on [facility] in June 2018 for 6 days, previously at [facility] twice in teens when patient was verbally aggressive and throwing items at mom), psychiatric dx of Asperger's (Autism spectrum disorder) PDD NOS, episodes of paranoia/impulse control disorder, no reported substance abuse, no current PMH reported though patient is somatically preoccupied, presenting today complaining of his lungs malfunctioning. Patient reports continued paranoid delusions of being followed and poisoned. He is notably anxious about this though he denies SI/HI. However, as per collateral from mom patient is very distressed by his somatic delusions and reported to her that he plans to kill himself as he feels his organs are all failing him. Patient is also reported to be non-compliant w/ olanzapine in the context of cross-titration from Abilify to olanzapine.

## 2018-09-11 NOTE — ED BEHAVIORAL HEALTH NOTE - BEHAVIORAL HEALTH NOTE
Writer spoke with patient’s mother, Shanel Bauer, [phone], on the phone, for collateral information. She reported the following:    The patient has been residing with the informant and her mother for the past 3 months due to his symptomatology. His last IP episode was at [facility] in June of this year. He is in treatment with Dr. Javier Orellana, [phone]. Dr. Orellana told the mother to have him brought to the [facility] ED if his symptoms did not improve with recent medication adjustments.     The patient has been doing nothing buy laying around the house since coming to her home. He has been delusional, thinking he has been poisoned and his organs are all dying. He has been saying for the past several days that he would therefore have to kill himself, though he did not verbalize a plan. He has never engaged in self-injurious behavior. Today he told the informant he did not realize she was a Satan worshipper. He also stated that “in a week we will all be dead.” The informant was not sure whether he meant this as a homicidal threat, nor who would be dead. He has not been aggressive toward others or property. He has been experiencing auditory hallucinations, and today said the “agents are telling me not to take the pills.” He has been turning off the TV and air conditioner in order to better hear the voices. He is currently prescribed Abilify 7.5 mg bid, and benzotropine 0.5 mg bid, recently added due to stiffness and facial grimacing. He is compliant with the informant’s supervision. The doctor directed that olanzapine, on which he did well in the hospital, be restarted, 5 mg daily. The patient has refused to take the olanzapine, however. He believes the Abilify causes him to experience difficulty breathing. Writer spoke with patient’s mother, Shanel Bauer, [phone], on the phone, for collateral information. She reported the following:    The patient has been residing with the informant and her mother for the past 3 months due to his symptomatology. His last IP episode was at [facility] in June of this year. He is in treatment with Dr. Javier Orellana, [phone]. Dr. Orellana told the mother to have him brought to the [facility] ED if his symptoms did not improve with recent medication adjustments.     The patient has been doing nothing buy laying around the house since coming to her home. He has been delusional, thinking he has been poisoned and his organs are all dying. He has been saying for the past several days that he would therefore have to kill himself, though he did not verbalize a plan. He has never engaged in self-injurious behavior. Today he told the informant he did not realize she was a Satan worshipper. He also stated that “in a week we will all be dead.” The informant was not sure whether he meant this as a homicidal threat, nor who would be dead. He has not been aggressive toward others or property. He has been experiencing auditory hallucinations, and today said the “agents are telling me not to take the pills.” He has been turning off the TV and air conditioner in order to better hear the voices. He is currently prescribed Abilify 7.5 mg bid, and benzotropine 0.5 mg bid, recently added due to stiffness and facial grimacing. He is compliant with the informant’s supervision. The doctor directed that olanzapine, on which he did well in the hospital, be restarted, 5 mg daily. The patient has refused to take the olanzapine, however. He believes the Abilify causes him to experience difficulty breathing.     A bed was obtained on [facility] 4 at [facility]. Writer called the patient's mother back and provided disposition notification, with information about the unit.
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
## 2018-09-11 NOTE — ED BEHAVIORAL HEALTH ASSESSMENT NOTE - DESCRIPTION
Calm and cooperative throughout.    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

## 2018-09-11 NOTE — ED ADULT TRIAGE NOTE - CHIEF COMPLAINT QUOTE
"I heard I have a fungus in my lungs, I heard it off the streets"  As per mother pt was dx with schizoaffective bipolar disorder.  As per pt "my medication was tainted".  As per mother pt "had episode that I was poisoning him".  Pt refused to take the Abilify this morning.  As per mother pt is "very sluggish"

## 2018-09-12 RX ADMIN — OLANZAPINE 5 MILLIGRAM(S): 15 TABLET, FILM COATED ORAL at 00:44

## 2018-09-12 RX ADMIN — Medication 2 MILLIGRAM(S): at 00:44

## 2018-09-13 RX ORDER — OLANZAPINE 15 MG/1
5 TABLET, FILM COATED ORAL ONCE
Qty: 0 | Refills: 0 | Status: COMPLETED | OUTPATIENT
Start: 2018-09-13 | End: 2018-09-13

## 2018-09-13 RX ADMIN — Medication 2 MILLIGRAM(S): at 17:15

## 2018-09-13 RX ADMIN — OLANZAPINE 5 MILLIGRAM(S): 15 TABLET, FILM COATED ORAL at 16:14

## 2018-09-14 RX ORDER — OLANZAPINE 15 MG/1
10 TABLET, FILM COATED ORAL AT BEDTIME
Qty: 0 | Refills: 0 | Status: DISCONTINUED | OUTPATIENT
Start: 2018-09-14 | End: 2018-09-18

## 2018-09-14 RX ADMIN — OLANZAPINE 10 MILLIGRAM(S): 15 TABLET, FILM COATED ORAL at 20:46

## 2018-09-14 RX ADMIN — Medication 2 MILLIGRAM(S): at 20:47

## 2018-09-15 PROCEDURE — 99232 SBSQ HOSP IP/OBS MODERATE 35: CPT

## 2018-09-15 RX ADMIN — Medication 1 MILLIGRAM(S): at 08:24

## 2018-09-15 RX ADMIN — Medication 2 MILLIGRAM(S): at 20:46

## 2018-09-15 RX ADMIN — Medication 50 MILLIGRAM(S): at 20:43

## 2018-09-15 RX ADMIN — OLANZAPINE 10 MILLIGRAM(S): 15 TABLET, FILM COATED ORAL at 20:43

## 2018-09-16 PROCEDURE — 99232 SBSQ HOSP IP/OBS MODERATE 35: CPT

## 2018-09-16 RX ADMIN — OLANZAPINE 10 MILLIGRAM(S): 15 TABLET, FILM COATED ORAL at 20:03

## 2018-09-16 RX ADMIN — Medication 1 MILLIGRAM(S): at 08:11

## 2018-09-17 RX ADMIN — OLANZAPINE 10 MILLIGRAM(S): 15 TABLET, FILM COATED ORAL at 20:20

## 2018-09-17 RX ADMIN — Medication 50 MILLIGRAM(S): at 20:20

## 2018-09-17 RX ADMIN — Medication 1 MILLIGRAM(S): at 08:42

## 2018-09-17 RX ADMIN — Medication 2 MILLIGRAM(S): at 20:20

## 2018-09-17 RX ADMIN — HALOPERIDOL DECANOATE 5 MILLIGRAM(S): 100 INJECTION INTRAMUSCULAR at 20:20

## 2018-09-18 RX ORDER — OLANZAPINE 15 MG/1
15 TABLET, FILM COATED ORAL AT BEDTIME
Qty: 0 | Refills: 0 | Status: DISCONTINUED | OUTPATIENT
Start: 2018-09-18 | End: 2018-09-21

## 2018-09-18 RX ADMIN — OLANZAPINE 15 MILLIGRAM(S): 15 TABLET, FILM COATED ORAL at 21:38

## 2018-09-18 RX ADMIN — Medication 1 MILLIGRAM(S): at 08:37

## 2018-09-19 PROCEDURE — 99232 SBSQ HOSP IP/OBS MODERATE 35: CPT

## 2018-09-19 RX ADMIN — OLANZAPINE 15 MILLIGRAM(S): 15 TABLET, FILM COATED ORAL at 20:29

## 2018-09-19 RX ADMIN — Medication 1 MILLIGRAM(S): at 12:43

## 2018-09-19 RX ADMIN — Medication 2 MILLIGRAM(S): at 20:30

## 2018-09-20 PROCEDURE — 99232 SBSQ HOSP IP/OBS MODERATE 35: CPT | Mod: 25

## 2018-09-20 RX ADMIN — OLANZAPINE 15 MILLIGRAM(S): 15 TABLET, FILM COATED ORAL at 21:54

## 2018-09-20 RX ADMIN — Medication 1 MILLIGRAM(S): at 08:55

## 2018-09-21 PROCEDURE — 99232 SBSQ HOSP IP/OBS MODERATE 35: CPT

## 2018-09-21 RX ORDER — OLANZAPINE 15 MG/1
20 TABLET, FILM COATED ORAL AT BEDTIME
Qty: 0 | Refills: 0 | Status: DISCONTINUED | OUTPATIENT
Start: 2018-09-21 | End: 2018-10-02

## 2018-09-21 RX ADMIN — OLANZAPINE 20 MILLIGRAM(S): 15 TABLET, FILM COATED ORAL at 20:51

## 2018-09-21 RX ADMIN — Medication 1 MILLIGRAM(S): at 08:28

## 2018-09-22 RX ADMIN — Medication 1 MILLIGRAM(S): at 08:40

## 2018-09-22 RX ADMIN — OLANZAPINE 20 MILLIGRAM(S): 15 TABLET, FILM COATED ORAL at 21:45

## 2018-09-23 RX ADMIN — OLANZAPINE 20 MILLIGRAM(S): 15 TABLET, FILM COATED ORAL at 21:20

## 2018-09-23 RX ADMIN — Medication 1 MILLIGRAM(S): at 08:47

## 2018-09-24 PROCEDURE — 99232 SBSQ HOSP IP/OBS MODERATE 35: CPT

## 2018-09-24 RX ADMIN — Medication 1 MILLIGRAM(S): at 09:04

## 2018-09-24 RX ADMIN — OLANZAPINE 20 MILLIGRAM(S): 15 TABLET, FILM COATED ORAL at 22:29

## 2018-09-25 PROCEDURE — 99232 SBSQ HOSP IP/OBS MODERATE 35: CPT

## 2018-09-25 RX ADMIN — Medication 1 MILLIGRAM(S): at 08:33

## 2018-09-25 RX ADMIN — OLANZAPINE 20 MILLIGRAM(S): 15 TABLET, FILM COATED ORAL at 21:13

## 2018-09-26 RX ORDER — ARIPIPRAZOLE 15 MG/1
5 TABLET ORAL DAILY
Qty: 0 | Refills: 0 | Status: DISCONTINUED | OUTPATIENT
Start: 2018-09-26 | End: 2018-09-28

## 2018-09-26 RX ADMIN — OLANZAPINE 20 MILLIGRAM(S): 15 TABLET, FILM COATED ORAL at 21:55

## 2018-09-26 RX ADMIN — Medication 1 MILLIGRAM(S): at 09:06

## 2018-09-27 RX ADMIN — ARIPIPRAZOLE 5 MILLIGRAM(S): 15 TABLET ORAL at 08:58

## 2018-09-27 RX ADMIN — OLANZAPINE 20 MILLIGRAM(S): 15 TABLET, FILM COATED ORAL at 21:03

## 2018-09-27 RX ADMIN — Medication 1 MILLIGRAM(S): at 08:58

## 2018-09-28 RX ORDER — ARIPIPRAZOLE 15 MG/1
10 TABLET ORAL DAILY
Qty: 0 | Refills: 0 | Status: DISCONTINUED | OUTPATIENT
Start: 2018-09-28 | End: 2018-10-02

## 2018-09-28 RX ADMIN — OLANZAPINE 20 MILLIGRAM(S): 15 TABLET, FILM COATED ORAL at 20:18

## 2018-09-28 RX ADMIN — ARIPIPRAZOLE 5 MILLIGRAM(S): 15 TABLET ORAL at 08:31

## 2018-09-28 RX ADMIN — Medication 1 MILLIGRAM(S): at 08:31

## 2018-09-29 RX ADMIN — Medication 1 MILLIGRAM(S): at 08:18

## 2018-09-29 RX ADMIN — ARIPIPRAZOLE 10 MILLIGRAM(S): 15 TABLET ORAL at 08:18

## 2018-09-29 RX ADMIN — OLANZAPINE 20 MILLIGRAM(S): 15 TABLET, FILM COATED ORAL at 21:06

## 2018-09-30 RX ADMIN — Medication 1 MILLIGRAM(S): at 09:40

## 2018-09-30 RX ADMIN — ARIPIPRAZOLE 10 MILLIGRAM(S): 15 TABLET ORAL at 09:40

## 2018-09-30 RX ADMIN — OLANZAPINE 20 MILLIGRAM(S): 15 TABLET, FILM COATED ORAL at 21:35

## 2018-10-01 RX ADMIN — Medication 1 MILLIGRAM(S): at 08:21

## 2018-10-01 RX ADMIN — ARIPIPRAZOLE 10 MILLIGRAM(S): 15 TABLET ORAL at 08:21

## 2018-10-01 RX ADMIN — OLANZAPINE 20 MILLIGRAM(S): 15 TABLET, FILM COATED ORAL at 20:37

## 2018-10-02 RX ORDER — ARIPIPRAZOLE 15 MG/1
400 TABLET ORAL
Qty: 400 | Refills: 0
Start: 2018-10-02 | End: 2018-10-31

## 2018-10-02 RX ORDER — ARIPIPRAZOLE 15 MG/1
5 TABLET ORAL ONCE
Qty: 0 | Refills: 0 | Status: COMPLETED | OUTPATIENT
Start: 2018-10-02 | End: 2018-10-02

## 2018-10-02 RX ORDER — OLANZAPINE 15 MG/1
10 TABLET, FILM COATED ORAL AT BEDTIME
Qty: 0 | Refills: 0 | Status: DISCONTINUED | OUTPATIENT
Start: 2018-10-02 | End: 2018-10-04

## 2018-10-02 RX ORDER — ARIPIPRAZOLE 15 MG/1
15 TABLET ORAL DAILY
Qty: 0 | Refills: 0 | Status: DISCONTINUED | OUTPATIENT
Start: 2018-10-02 | End: 2018-10-04

## 2018-10-02 RX ADMIN — Medication 1 MILLIGRAM(S): at 09:39

## 2018-10-02 RX ADMIN — ARIPIPRAZOLE 5 MILLIGRAM(S): 15 TABLET ORAL at 11:26

## 2018-10-02 RX ADMIN — OLANZAPINE 10 MILLIGRAM(S): 15 TABLET, FILM COATED ORAL at 21:23

## 2018-10-02 RX ADMIN — ARIPIPRAZOLE 10 MILLIGRAM(S): 15 TABLET ORAL at 09:39

## 2018-10-03 RX ORDER — ARIPIPRAZOLE 15 MG/1
1 TABLET ORAL
Qty: 14 | Refills: 0
Start: 2018-10-03 | End: 2018-10-16

## 2018-10-03 RX ORDER — OLANZAPINE 15 MG/1
1 TABLET, FILM COATED ORAL
Qty: 30 | Refills: 0
Start: 2018-10-03 | End: 2018-11-01

## 2018-10-03 RX ADMIN — ARIPIPRAZOLE 15 MILLIGRAM(S): 15 TABLET ORAL at 08:10

## 2018-10-03 RX ADMIN — Medication 0.5 MILLIGRAM(S): at 08:10

## 2018-10-03 RX ADMIN — OLANZAPINE 10 MILLIGRAM(S): 15 TABLET, FILM COATED ORAL at 20:31

## 2018-10-04 VITALS — TEMPERATURE: 98 F

## 2018-10-04 RX ORDER — ARIPIPRAZOLE 15 MG/1
400 TABLET ORAL ONCE
Qty: 0 | Refills: 0 | Status: COMPLETED | OUTPATIENT
Start: 2018-10-04 | End: 2018-10-04

## 2018-10-04 RX ADMIN — ARIPIPRAZOLE 15 MILLIGRAM(S): 15 TABLET ORAL at 09:21

## 2018-10-04 RX ADMIN — ARIPIPRAZOLE 400 MILLIGRAM(S): 15 TABLET ORAL at 10:45

## 2018-10-04 RX ADMIN — Medication 0.5 MILLIGRAM(S): at 09:21

## 2018-10-16 ENCOUNTER — OUTPATIENT (OUTPATIENT)
Dept: OUTPATIENT SERVICES | Facility: HOSPITAL | Age: 28
LOS: 1 days | Discharge: STILL ADMITTED OR ADMINISTRATIVE | End: 2018-10-16

## 2018-10-16 ENCOUNTER — OUTPATIENT (OUTPATIENT)
Dept: OUTPATIENT SERVICES | Facility: HOSPITAL | Age: 28
LOS: 1 days | Discharge: HOME | End: 2018-10-16
Payer: COMMERCIAL

## 2018-10-17 DIAGNOSIS — F29 UNSPECIFIED PSYCHOSIS NOT DUE TO A SUBSTANCE OR KNOWN PHYSIOLOGICAL CONDITION: ICD-10-CM

## 2018-10-17 DIAGNOSIS — F84.5 ASPERGER'S SYNDROME: ICD-10-CM

## 2018-12-06 NOTE — ED BEHAVIORAL HEALTH ASSESSMENT NOTE - PRIVATE RESIDENCE
Progress Notes by Rivas Grimaldo MD at 01/26/18 11:23 AM     Author:  Rivas Grimaldo MD Service:  (none) Author Type:  Physician     Filed:  01/26/18 11:42 AM Encounter Date:  1/26/2018 Status:  Signed     :  Rivas Grimaldo MD (Physician)            Dayday Cardozo is a 63 year old male who presents for evaluation of[MP1.1T] a few issues[MP1.2T] . The patient is accompanied by[MP1.1T] self.[MP1.2M]    Subjective:[MP1.1T]  1.[MP1.2M] Patient complains of[MP1.2T] coldness hands and feet for a few months;   also leg cramp[MP1.2M] right[MP1.2T] leg last night knee to foot  occ foot cramps  some burning[MP1.2M] right[MP1.2T] foot medial around 1st MTP    + increased fatigue, but also has had a lot going on; finds that he can fall asleep easily;[MP1.2M]     ROS:[MP1.1T]no fever or chills, chest pain, shortness of breath[MP1.2T]     Past Medical History:     Diagnosis  Date   • Essential hypertension, benign 3/7/2001   • Other and unspecified peripheral vertigo(386.19) 2004   • Skin cancer         Current Outpatient Prescriptions     Medication  Sig   • enalapril (VASOTEC) 10 MG tablet Take 1 Tab by mouth daily.   • fluorouracil (EFUDEX) 5 % cream Apply 2 times daily to affected areas of face x 2 weeks; for right had x 4 weeks   • albuterol (PROAIR HFA) 108 (90 BASE) MCG/ACT inhaler Inhale 2 Puffs by mouth every 4 (four) hours as needed for Wheezing.      No medication comments found.[MP1.3T]   Allergies:[MP1.1T]  Allergies     Allergen  Reactions   • Relafen [Nabumetone] Hives       /80  Pulse 76  Temp 98.2 °F (36.8 °C) (Tympanic)  Resp 19  Ht 6' (1.829 m)  Wt 205 lb (93 kg)  BMI 27.8 kg/m2[MP1.3T]   --General appearance - alert & oriented, pleasant and comfortable  --Head - Normocephalic. No masses, lesions, tenderness or abnormalities  --Eyes - conjunctivae/corneas clear. PERRL, EOM's intact.  --Oropharynx - Lips, mucosa, tongue, teeth and gums normal. Oropharynx pink and moist.  --Neck -  Neck supple. No adenopathy.  Thyroid symmetric, normal size and no nodules.  --Lungs - CTA throughout without crackles, rhonchi, or wheezes.  Patient has good air exchange without pleuritic symptoms.  --Heart -  RRR w/o S3, S4, or murmurs.   --Extremities - Extremities normal. No deformities, edema, or skin discoloration   --Neuro - CN II-XII GI, DTR's +2/4 throughout, sensory and motor innervation intact, no pathologic reflexes elicited.[MP1.2T]    distal pulses intact[MP1.2M]      Assessment/Plan:[MP1.1T]  Dayday was seen today for other and other.    Diagnoses and all orders for this visit:    Leg cramps    Fatigue, unspecified type    Cold extremities[MP1.4T]  ? etiology.[MP1.2T]   nonspecific symptoms;[MP1.2M]     -     CBC - TODAY; Future    -     CMP (random) today; Future  -     TSH (WITH REFLEX TO FREE T4 - TODAY; Future  -     VITAMIN D, 25-HYDROXY - TODAY; Future  -     IRON & TIBC (W %IRON SAT) - TODAY; Future  -     IMMUNOGLOBULIN A (IGA) [[id number]]; Future  -     TISSUE TRANSGLUTAMINASE AB, IGA [[id number]]; Future  -     ENDOMYSIAL IGA ANTIBODY; Future  -     VITAMIN B12 - TODAY; Future[MP1.4T]    if all testing negative, consider sleep study  consider gabapentin[MP1.2M]  Electronically Signed by:    Rivas Grimaldo MD , 1/26/2018[MP1.1T]        Revision History        User Key Date/Time User Provider Type Action    > MP1.4 01/26/18 11:42 AM Rivas Grimaldo MD Physician Sign     MP1.2 01/26/18 11:29 AM Rivas Grimaldo MD Physician      MP1.3 01/26/18 11:24 AM Rivas Grimaldo MD Physician      MP1.1 01/26/18 11:23 AM Rivas Grimaldo MD Physician     M - Manual, T - Template             recently moved to [place] (previously living with mom until 1 week ago)

## 2019-04-11 NOTE — ED BEHAVIORAL HEALTH ASSESSMENT NOTE - PERSONAL COLLATERAL PHONE
Pt called  to speak to nurse regarding lower back pain, and stated when he urinate its a yellowish foam color. [phone]   [phone]
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
## 2020-08-18 ENCOUNTER — EMERGENCY (EMERGENCY)
Facility: HOSPITAL | Age: 30
LOS: 1 days | Discharge: PSYCHIATRIC OR SUBSTANCE USE FACILITY | End: 2020-08-18
Attending: EMERGENCY MEDICINE
Payer: MEDICAID

## 2020-08-18 VITALS
HEART RATE: 112 BPM | DIASTOLIC BLOOD PRESSURE: 93 MMHG | OXYGEN SATURATION: 99 % | TEMPERATURE: 99 F | HEIGHT: 72 IN | RESPIRATION RATE: 18 BRPM | SYSTOLIC BLOOD PRESSURE: 133 MMHG | WEIGHT: 190.04 LBS

## 2020-08-18 LAB
ALBUMIN SERPL ELPH-MCNC: 5 G/DL — SIGNIFICANT CHANGE UP (ref 3.3–5)
ALP SERPL-CCNC: 69 U/L — SIGNIFICANT CHANGE UP (ref 40–120)
ALT FLD-CCNC: 27 U/L — SIGNIFICANT CHANGE UP (ref 10–45)
ANION GAP SERPL CALC-SCNC: 16 MMOL/L — SIGNIFICANT CHANGE UP (ref 5–17)
APAP SERPL-MCNC: <15 UG/ML — SIGNIFICANT CHANGE UP (ref 10–30)
APPEARANCE UR: CLEAR — SIGNIFICANT CHANGE UP
AST SERPL-CCNC: 21 U/L — SIGNIFICANT CHANGE UP (ref 10–40)
BACTERIA # UR AUTO: NEGATIVE — SIGNIFICANT CHANGE UP
BASOPHILS # BLD AUTO: 0.03 K/UL — SIGNIFICANT CHANGE UP (ref 0–0.2)
BASOPHILS NFR BLD AUTO: 0.3 % — SIGNIFICANT CHANGE UP (ref 0–2)
BILIRUB SERPL-MCNC: 0.7 MG/DL — SIGNIFICANT CHANGE UP (ref 0.2–1.2)
BILIRUB UR-MCNC: NEGATIVE — SIGNIFICANT CHANGE UP
BUN SERPL-MCNC: 15 MG/DL — SIGNIFICANT CHANGE UP (ref 7–23)
CALCIUM SERPL-MCNC: 10 MG/DL — SIGNIFICANT CHANGE UP (ref 8.4–10.5)
CHLORIDE SERPL-SCNC: 102 MMOL/L — SIGNIFICANT CHANGE UP (ref 96–108)
CO2 SERPL-SCNC: 20 MMOL/L — LOW (ref 22–31)
COLOR SPEC: YELLOW — SIGNIFICANT CHANGE UP
CREAT SERPL-MCNC: 0.9 MG/DL — SIGNIFICANT CHANGE UP (ref 0.5–1.3)
DIFF PNL FLD: NEGATIVE — SIGNIFICANT CHANGE UP
EOSINOPHIL # BLD AUTO: 0.12 K/UL — SIGNIFICANT CHANGE UP (ref 0–0.5)
EOSINOPHIL NFR BLD AUTO: 1.3 % — SIGNIFICANT CHANGE UP (ref 0–6)
EPI CELLS # UR: 0 /HPF — SIGNIFICANT CHANGE UP
ETHANOL SERPL-MCNC: SIGNIFICANT CHANGE UP MG/DL (ref 0–10)
GLUCOSE SERPL-MCNC: 113 MG/DL — HIGH (ref 70–99)
GLUCOSE UR QL: ABNORMAL
HCT VFR BLD CALC: 48.3 % — SIGNIFICANT CHANGE UP (ref 39–50)
HGB BLD-MCNC: 16 G/DL — SIGNIFICANT CHANGE UP (ref 13–17)
HYALINE CASTS # UR AUTO: 1 /LPF — SIGNIFICANT CHANGE UP (ref 0–2)
IMM GRANULOCYTES NFR BLD AUTO: 0.4 % — SIGNIFICANT CHANGE UP (ref 0–1.5)
KETONES UR-MCNC: NEGATIVE — SIGNIFICANT CHANGE UP
LEUKOCYTE ESTERASE UR-ACNC: NEGATIVE — SIGNIFICANT CHANGE UP
LYMPHOCYTES # BLD AUTO: 1.72 K/UL — SIGNIFICANT CHANGE UP (ref 1–3.3)
LYMPHOCYTES # BLD AUTO: 19 % — SIGNIFICANT CHANGE UP (ref 13–44)
MCHC RBC-ENTMCNC: 29.7 PG — SIGNIFICANT CHANGE UP (ref 27–34)
MCHC RBC-ENTMCNC: 33.1 GM/DL — SIGNIFICANT CHANGE UP (ref 32–36)
MCV RBC AUTO: 89.6 FL — SIGNIFICANT CHANGE UP (ref 80–100)
MONOCYTES # BLD AUTO: 0.55 K/UL — SIGNIFICANT CHANGE UP (ref 0–0.9)
MONOCYTES NFR BLD AUTO: 6.1 % — SIGNIFICANT CHANGE UP (ref 2–14)
NEUTROPHILS # BLD AUTO: 6.61 K/UL — SIGNIFICANT CHANGE UP (ref 1.8–7.4)
NEUTROPHILS NFR BLD AUTO: 72.9 % — SIGNIFICANT CHANGE UP (ref 43–77)
NITRITE UR-MCNC: NEGATIVE — SIGNIFICANT CHANGE UP
NRBC # BLD: 0 /100 WBCS — SIGNIFICANT CHANGE UP (ref 0–0)
PH UR: 5.5 — SIGNIFICANT CHANGE UP (ref 5–8)
PLATELET # BLD AUTO: 206 K/UL — SIGNIFICANT CHANGE UP (ref 150–400)
POTASSIUM SERPL-MCNC: 3.8 MMOL/L — SIGNIFICANT CHANGE UP (ref 3.5–5.3)
POTASSIUM SERPL-SCNC: 3.8 MMOL/L — SIGNIFICANT CHANGE UP (ref 3.5–5.3)
PROT SERPL-MCNC: 7.9 G/DL — SIGNIFICANT CHANGE UP (ref 6–8.3)
PROT UR-MCNC: ABNORMAL
RBC # BLD: 5.39 M/UL — SIGNIFICANT CHANGE UP (ref 4.2–5.8)
RBC # FLD: 11.9 % — SIGNIFICANT CHANGE UP (ref 10.3–14.5)
RBC CASTS # UR COMP ASSIST: 1 /HPF — SIGNIFICANT CHANGE UP (ref 0–4)
SALICYLATES SERPL-MCNC: <2 MG/DL — LOW (ref 15–30)
SARS-COV-2 RNA SPEC QL NAA+PROBE: SIGNIFICANT CHANGE UP
SODIUM SERPL-SCNC: 138 MMOL/L — SIGNIFICANT CHANGE UP (ref 135–145)
SP GR SPEC: 1.03 — HIGH (ref 1.01–1.02)
TSH SERPL-MCNC: 1.2 UIU/ML — SIGNIFICANT CHANGE UP (ref 0.27–4.2)
UROBILINOGEN FLD QL: NEGATIVE — SIGNIFICANT CHANGE UP
WBC # BLD: 9.07 K/UL — SIGNIFICANT CHANGE UP (ref 3.8–10.5)
WBC # FLD AUTO: 9.07 K/UL — SIGNIFICANT CHANGE UP (ref 3.8–10.5)
WBC UR QL: 1 /HPF — SIGNIFICANT CHANGE UP (ref 0–5)

## 2020-08-18 PROCEDURE — 99284 EMERGENCY DEPT VISIT MOD MDM: CPT

## 2020-08-18 PROCEDURE — 80053 COMPREHEN METABOLIC PANEL: CPT

## 2020-08-18 PROCEDURE — 85027 COMPLETE CBC AUTOMATED: CPT

## 2020-08-18 PROCEDURE — 81001 URINALYSIS AUTO W/SCOPE: CPT

## 2020-08-18 PROCEDURE — 93010 ELECTROCARDIOGRAM REPORT: CPT

## 2020-08-18 PROCEDURE — 84443 ASSAY THYROID STIM HORMONE: CPT

## 2020-08-18 PROCEDURE — 96372 THER/PROPH/DIAG INJ SC/IM: CPT

## 2020-08-18 PROCEDURE — 80307 DRUG TEST PRSMV CHEM ANLYZR: CPT

## 2020-08-18 PROCEDURE — 93005 ELECTROCARDIOGRAM TRACING: CPT

## 2020-08-18 PROCEDURE — 99285 EMERGENCY DEPT VISIT HI MDM: CPT | Mod: 25

## 2020-08-18 PROCEDURE — 99285 EMERGENCY DEPT VISIT HI MDM: CPT

## 2020-08-18 PROCEDURE — 90792 PSYCH DIAG EVAL W/MED SRVCS: CPT | Mod: 95

## 2020-08-18 RX ORDER — DIPHENHYDRAMINE HCL 50 MG
50 CAPSULE ORAL ONCE
Refills: 0 | Status: DISCONTINUED | OUTPATIENT
Start: 2020-08-18 | End: 2020-08-18

## 2020-08-18 RX ORDER — HALOPERIDOL DECANOATE 100 MG/ML
5 INJECTION INTRAMUSCULAR ONCE
Refills: 0 | Status: DISCONTINUED | OUTPATIENT
Start: 2020-08-18 | End: 2020-08-18

## 2020-08-18 RX ORDER — HALOPERIDOL DECANOATE 100 MG/ML
5 INJECTION INTRAMUSCULAR ONCE
Refills: 0 | Status: COMPLETED | OUTPATIENT
Start: 2020-08-18 | End: 2020-08-18

## 2020-08-18 RX ADMIN — HALOPERIDOL DECANOATE 5 MILLIGRAM(S): 100 INJECTION INTRAMUSCULAR at 12:15

## 2020-08-18 RX ADMIN — Medication 2 MILLIGRAM(S): at 12:15

## 2020-08-18 NOTE — ED PROVIDER NOTE - PHYSICAL EXAMINATION
GENERAL: young male, lying in bed, NAD. States " Can I speak to someone who matters ". tachycardic otherwise Vital signs are within normal limits  HEENT: NC/AT, conjunctiva noninjected, sclera anicteric, moist mucous membranes,  NECK: Supple, trachea midline  LUNG: Nonlabored respirations  CV: RRR, Pulses- Radial: 2+ b/l  ABDOMEN: Nondistended  MSK: No visible deformities  NEURO: AAOx4 (to person, place, time, event), no tremor, steady gait  PSYCH: APPEARS ACUTELY PSYCHOTIC. DELUSIONAL. BUT COOPERATIVE.

## 2020-08-18 NOTE — ED BEHAVIORAL HEALTH NOTE - BEHAVIORAL HEALTH NOTE
Pt became acutely agitated after he was given instructions to put in a gown. Pt got up and started threatening staff members security and RN, making very delusional; remarks and accusing staff of saying derogatory words towards  him even when he is not spoken to. Pt  also demanded to leave and be discharged and stated he will not comply with any of the diagnostic procedures ordered by MD. Pt recived Haldol 5 mg and Ativan 2 mg IM at 1215H for acute agitation. Pt continued to argue with staff members but was persuaded by staff to comply. Pt remains on 1:1 for safety

## 2020-08-18 NOTE — ED ADULT NURSE REASSESSMENT NOTE - NS ED NURSE REASSESS COMMENT FT1
Report received from JARETT Elkins in purple. pt currently speaking with [facility]. Maintained on 1:1 for safety.

## 2020-08-18 NOTE — ED PROVIDER NOTE - ATTENDING CONTRIBUTION TO CARE
attending Grecia: 29yM h/o PDD, anxiety BIBA after family reported patient was verbally aggressive and delusional. As per EMS report, patient has not been taking home meds of abilify and haldol. On initial exam, pt with poor eye contact, stating he does not know why he is here, without somatic or psychiatric complaints, asking for food. Will place on constant observation, labs, discuss with family for collateral, psych eval in ED.

## 2020-08-18 NOTE — ED PROVIDER NOTE - OBJECTIVE STATEMENT
29 y.o. male hx of PDD, anxiety BIBA due to concern by family that patient is being verbally aggressive and delusional. As per EMS report, patient has not been taking home meds of abilify and haldol. 29 y.o. male hx of PDD, anxiety BIBA due to concern by family that patient is being verbally aggressive and delusional. As per EMS report, patient has not been taking home meds of abilify and haldol. Attempted to call mother using information in chart but no one answered. Voicemail left. During my interview, patient was questioning why he was here. He stated he lives with a foster mom. States that he is currently in long term and we are trying to keep him here. She does not want to be evaluated because he is scared something would be found. Denies any acute complaints. States he just wants to go home.

## 2020-08-18 NOTE — ED ADULT NURSE NOTE - NS_NURSE_BED_LOCATION_ED_ALL_ED_FT
DATE OF SERVICE:  10/25/2019    PREOPERATIVE DIAGNOSIS:  Dental caries.    POSTOPERATIVE DIAGNOSIS:  Dental caries.    NAME OF OPERATION:  Dental restorations and extractions.    SURGEON:  Shanice Carney DDS    ASSISTANT:  Sally Webb.    ANESTHESIOLOGIST:  Saul Howe MD    ANESTHESIA:  General.    CLINICAL INDICATION FOR PROCEDURE:  Unable to tolerate dental procedure in   usual dental setting due to acute reaction to stress.    DESCRIPTION OF PROCEDURE:  The patient was transferred from the holding room   to the operating room and placed on the operating room table in a supine   position.  Anesthesia was induced using oral mask and was maintained via   nasoendotracheal intubation.  The patient was then draped in the approved   manner for oral procedures and oropharyngeal pack was placed.  Intraoral   radiographs were taken, a total of 4 radiographs consisting of 4 periapicals.    Prophylaxis and fluoride was performed.  A Rubber dam was placed prior to   dental restorations.  Restorations were placed on the following teeth:  Resin   restorations were placed on teeth:  tooth I, distal occlusal composite; on tooth J,   mesial occlusal composite; tooth K, mesial occlusal composite; tooth L, distal   occlusal composite; tooth T, mesial occlusal composite.  Indirect pulp cap was   placed on tooth B. Stainless steel crowns were placed on teeth A and B.    Extraction of tooth S was performed via forceps delivery.      Other procedure that was performed, a lower alginate impression was taken in order to   fabricate a space maintenance appliance.  Local anesthetic administered was   0.5 mL of 2% lidocaine 1:100,000 epinephrine.  Gelfoam was placed into the   socket of tooth S.  All bleeding was deemed to be arrested.  The mouth was   rinsed and suctioned free of all debris.  Topical fluoride of 1.23% APF foam   was applied to the teeth.  The mouth was re-suctioned and the oropharyngeal   pack was removed.   The patient was extubated.  The patient was transferred to   the postanesthesia care unit.  The patient tolerated the procedure well.    There were no complications.    ESTIMATED BLOOD LOSS:  Less than 5 mL       ____________________________________     SERGEY SCHMITZ / ANTOINETTE    DD:  10/25/2019 14:09:44  DT:  10/25/2019 15:20:27    D#:  [id number]  Job#:  [id number]     [address]

## 2020-08-18 NOTE — ED ADULT NURSE NOTE - OBJECTIVE STATEMENT
29 year old male presents to the ED by EMS for psych eval.  Per EMS, patient is prescribed Haldol and Abilify and has not taken the medication as prescribed and has been more verbally aggressive with his mother.  Patient is not agreeable to physical exam or to changing and says he is "in shelter".  Patient said he does not know why he is here.

## 2020-08-18 NOTE — ED BEHAVIORAL HEALTH NOTE - BEHAVIORAL HEALTH NOTE
===================  PRE-HOSPITAL COURSE  ===================    SOURCE: Chart    DETAILS: Patient brought in by EMS activated by mother due to worsening psychotic symptoms in the setting of medication non-compliance    ============  ED COURSE   ============    SOURCE: ED RN, Chart    ARRIVAL: EMS    BELONGINGS: No items of note    BEHAVIOR: Patient arrived to the ED alert and oriented x3, grooming/hygiene WNL. Patient was uncooperative with ED medical and safety protocols, refusing exam/blood work and refusing to change into gown. Patient appeared acutely paranoid with poor eye contact and anxious affect, stating he was in long term and they were forcing him to stay there. Patient denied SI/HI or other acute psychiatric symptoms himself and stated he didn’t need to be there. Patient then became agitated and required medication for safety. Patient slept the majority of ED course thereafter, once awake he is more calm and agreeable to evaluation.     TREATMENT: Haldol 5mg, Ativan 2mg IM ~1215    VISITORS: None    ========================  COLLATERAL  ========================    NAME: Shanel    NUMBER: [phone]    RELATIONSHIP: Mother    RELIABILITY: Good    COMMENTS: Patient lives in attached apartment to mother’s home, she has frequent contact with him    ========================  HPI  ========================    BASELINE FUNCTIONING: Patient resides in an attached apartment alone that is connected to mother’s home. Patient is a college graduate with a history degree, he has never worked but mother describes him to be very intelligent. Patient has no friends and his primary support system is his family/brothers. Patient normally spends his time alone in his apartment. Patient can have some baseline minor paranoia and anxious mood but normally able to function. Patient sees psychiatrist Dr. Guillen outpatient, currently supposed to have Abilify 400mg monthly injection and Haldol .5mg at night.     DATE HPI STARTED: ~January    DECOMPENSATION: Per mother patient was doing relatively well up until January when he stopped seeing his therapist and stopped taking his medication. Since that time he has been increasingly paranoid which has worsened in recent weeks. Mother states this has been pretty much daily without much relief. Mother states he feels that agents are after him, he spends most of the night awake looking out the window counting cars and thinking they are after him. Patient is pacing a lot, sometimes gets angry and yells when confronted but mother denies he has been physically aggressive or threatening anyone. Patient has also reported thinking certain foods are being poisoned like milk/bread but she believes he is eating as she brings him food each day and it seems he is consuming it. Patient seems to be neglecting his hygiene, has to be prompted to change clothes and sometimes showering once every 2 weeks. Patient was at his brother’s home the other day and felt patient seemed off his baseline as well, was pacing a lot in the house. Mother denies patient voiced SI/HI. States patient “sees trucks” but it is unclear if this is a VH, no report of AH. No substance abuse known. Mother spoke with patient’s psychiatrist earlier due to continued non-compliance and paranoia, felt patient should be evaluated in ED so mother activated 911. Mother feels patient would benefit from admission at this time for stabilization and safety.     SUICIDALITY: None     VIOLENCE: None    SUBSTANCE: None    ========================  PAST PSYCHIATRIC HISTORY  ========================    DATE PAST PSYCHIATRIC HISTORY STARTED:    MAIN PSYCHIATRIC DIAGNOSIS: Bipolar vs Schizoaff    PSYCHIATRIC HOSPITALIZATIONS: Multiple prior hospitalizations including [facility]    PRIOR ILLNESS: Past episodes of psychosis/makeda    SUICIDALITY: No past Self-harm or attempts, per past records has made SI statements before    VIOLENCE: None    SUBSTANCE USE: None    ==============  OTHER HISTORY  ==============    CURRENT MEDICATION: Per mother Abilify 400mg injection monthly (not since Feb) and Seroquel .5mg PM (not since January)    LEGAL ISSUES: None    FIREARM ACCESS: None known    1.	*In the past 14 days, has the patient been around anyone with a positive COVID-19 test?*   (  ) Yes   ( x ) No   (  ) Unknown- Reason (e.g. collateral uncertain, refusing to answer, etc.):  ______  IF YES PROCEED TO QUESTION #2. IF NO or UNKNOWN, PLEASE SKIP TO QUESTION #7  2.	Was the patient within 6 feet of them for at least 15 minutes? (  ) Yes   (  ) No   (  ) Unknown- Reason: ______    3.	Did the patient provide care for them? (  ) Yes   (  ) No   (  ) Unknown- Reason: ______    4.	Did the patient have direct physical contact with them (touched, hugged, or kissed them)? (  ) Yes   (  ) No    (  ) Unknown- Reason: ______    5.	Did the patient share eating or drinking utensils with them? (  ) Yes   (  ) No    (  ) Unknown- Reason: ______    6.	Have they sneezed, coughed, or somehow got respiratory droplets on the patient? (  ) Yes   (  ) No    (  ) Unknown- Reason: ______    7.	*Has the patient been out of [place] within the past 14 days?*  (  ) Yes   ( x ) No   (  ) Unknown- Reason (e.g. collateral uncertain, refusing to answer, etc.): _______  IF YES PLEASE ANSWER THE FOLLOWING QUESTIONS:  8.	Which state/country has the patient been to? ______   9.	Was the patient there over 24 hours? (  ) Yes   (  ) No    (  ) Unknown- Reason: ______    10.	What date did the patient return to [facility]? ______

## 2020-08-18 NOTE — ED PROVIDER NOTE - PMH
Anxiety    Pervasive developmental disorder Anxiety    Bipolar affective    Pervasive developmental disorder    Schizoaffective disorder

## 2020-08-18 NOTE — ED ADULT NURSE REASSESSMENT NOTE - NS ED NURSE REASSESS COMMENT FT1
Pt provided food and tolerating PO intake well. Denies SI/HI at this time. Maintained on 1:1 for safety. Safety and comfort measures maintained.

## 2020-08-18 NOTE — ED ADULT NURSE NOTE - NSIMPLEMENTINTERV_GEN_ALL_ED
Implemented All Universal Safety Interventions:  [place] to call system. Call bell, personal items and telephone within reach. Instruct patient to call for assistance. Room bathroom lighting operational. Non-slip footwear when patient is off stretcher. Physically safe environment: no spills, clutter or unnecessary equipment. Stretcher in lowest position, wheels locked, appropriate side rails in place.

## 2020-08-18 NOTE — ED PROVIDER NOTE - NS ED ROS FT
CONSTITUTIONAL: No fevers, chills, fatigue, dizziness, weakness  HEENT: No loss of vision, double vision, blurry vision, nasal congestion, runny nose, sore throat  CV: No chest pain, palpitations  PULM: No cough, shortness of breath  GI: No abdominal pain, nausea, vomiting, diarrhea  SKIN: No rashes, swelling  MSK: No muscle aches, joint aches  NEURO: no headache, paresthesias  PSYCHIATRIC: Denies suicidal, homicidal ideations. No auditory, visual, tactile hallucinations.

## 2020-08-19 ENCOUNTER — INPATIENT (INPATIENT)
Facility: HOSPITAL | Age: 30
LOS: 13 days | Discharge: HOME | End: 2020-09-02
Attending: PSYCHIATRY & NEUROLOGY | Admitting: PSYCHIATRY & NEUROLOGY
Payer: MEDICAID

## 2020-08-19 VITALS
RESPIRATION RATE: 18 BRPM | HEART RATE: 67 BPM | SYSTOLIC BLOOD PRESSURE: 117 MMHG | OXYGEN SATURATION: 98 % | DIASTOLIC BLOOD PRESSURE: 78 MMHG | TEMPERATURE: 98 F

## 2020-08-19 VITALS — WEIGHT: 195.99 LBS | HEIGHT: 74 IN | TEMPERATURE: 98 F

## 2020-08-19 DIAGNOSIS — F84.9 PERVASIVE DEVELOPMENTAL DISORDER, UNSPECIFIED: ICD-10-CM

## 2020-08-19 DIAGNOSIS — F25.9 SCHIZOAFFECTIVE DISORDER, UNSPECIFIED: ICD-10-CM

## 2020-08-19 DIAGNOSIS — F25.0 SCHIZOAFFECTIVE DISORDER, BIPOLAR TYPE: ICD-10-CM

## 2020-08-19 PROCEDURE — 99222 1ST HOSP IP/OBS MODERATE 55: CPT

## 2020-08-19 RX ORDER — DIPHENHYDRAMINE HCL 50 MG
50 CAPSULE ORAL EVERY 4 HOURS
Refills: 0 | Status: DISCONTINUED | OUTPATIENT
Start: 2020-08-19 | End: 2020-08-19

## 2020-08-19 RX ORDER — HALOPERIDOL DECANOATE 100 MG/ML
5 INJECTION INTRAMUSCULAR EVERY 4 HOURS
Refills: 0 | Status: DISCONTINUED | OUTPATIENT
Start: 2020-08-19 | End: 2020-08-19

## 2020-08-19 RX ORDER — DIPHENHYDRAMINE HCL 50 MG
50 CAPSULE ORAL ONCE
Refills: 0 | Status: DISCONTINUED | OUTPATIENT
Start: 2020-08-19 | End: 2020-08-19

## 2020-08-19 RX ORDER — CLONAZEPAM 1 MG
0.5 TABLET ORAL ONCE
Refills: 0 | Status: COMPLETED | OUTPATIENT
Start: 2020-08-19 | End: 2020-08-19

## 2020-08-19 RX ORDER — OLANZAPINE 15 MG/1
10 TABLET, FILM COATED ORAL ONCE
Refills: 0 | Status: DISCONTINUED | OUTPATIENT
Start: 2020-08-19 | End: 2020-08-22

## 2020-08-19 RX ORDER — POLYETHYLENE GLYCOL 3350 17 G/17G
17 POWDER, FOR SOLUTION ORAL DAILY
Refills: 0 | Status: DISCONTINUED | OUTPATIENT
Start: 2020-08-19 | End: 2020-09-02

## 2020-08-19 RX ORDER — OLANZAPINE 15 MG/1
7.5 TABLET, FILM COATED ORAL ONCE
Refills: 0 | Status: DISCONTINUED | OUTPATIENT
Start: 2020-08-19 | End: 2020-08-23

## 2020-08-19 RX ORDER — HALOPERIDOL DECANOATE 100 MG/ML
5 INJECTION INTRAMUSCULAR ONCE
Refills: 0 | Status: DISCONTINUED | OUTPATIENT
Start: 2020-08-19 | End: 2020-08-19

## 2020-08-19 RX ORDER — TRAZODONE HCL 50 MG
50 TABLET ORAL AT BEDTIME
Refills: 0 | Status: DISCONTINUED | OUTPATIENT
Start: 2020-08-19 | End: 2020-09-02

## 2020-08-19 RX ORDER — ARIPIPRAZOLE 15 MG/1
10 TABLET ORAL AT BEDTIME
Refills: 0 | Status: DISCONTINUED | OUTPATIENT
Start: 2020-08-19 | End: 2020-08-20

## 2020-08-19 RX ORDER — ARIPIPRAZOLE 15 MG/1
5 TABLET ORAL AT BEDTIME
Refills: 0 | Status: DISCONTINUED | OUTPATIENT
Start: 2020-08-19 | End: 2020-08-19

## 2020-08-19 RX ORDER — OLANZAPINE 15 MG/1
5 TABLET, FILM COATED ORAL EVERY 4 HOURS
Refills: 0 | Status: DISCONTINUED | OUTPATIENT
Start: 2020-08-19 | End: 2020-08-23

## 2020-08-19 RX ORDER — ACETAMINOPHEN 500 MG
650 TABLET ORAL EVERY 6 HOURS
Refills: 0 | Status: DISCONTINUED | OUTPATIENT
Start: 2020-08-19 | End: 2020-09-02

## 2020-08-19 NOTE — ED ADULT NURSE REASSESSMENT NOTE - NS ED NURSE REASSESS COMMENT FT1
Pt awake and calm, updated on plan of care awaiting bed placement. Pt observed sitting up in stretcher conversing with RN without difficulty. Breathing spontaneous and unlabored. Provided food at this time. Maintained on 1:1 for safety.

## 2020-08-19 NOTE — ED BEHAVIORAL HEALTH ASSESSMENT NOTE - OTHER
external mother see above guarded tenuous preoccupied with discharge pt does not fully cooperate with cognitive assessment

## 2020-08-19 NOTE — ED BEHAVIORAL HEALTH ASSESSMENT NOTE - PSYCHIATRIC ISSUES AND PLAN (INCLUDE STANDING AND PRN MEDICATION)
Per pt's mother, pt had a good response to Abilify and haldol in the past. Would start Abilify 10mg daily, monitor QTc and vitals. Haldol 5mg/ Ativan 2mg/ Benadryl 50mg PO/IM q6h PRN severe agitation Per pt's mother, pt had a good response to Abilify and haldol in the past. Would start Abilify 10mg daily, monitor QTc and vitals. Consider ROBERSON prior to inpt discharge. Haldol 5mg/ Ativan 2mg/ Benadryl 50mg PO/IM q6h PRN severe agitation

## 2020-08-19 NOTE — ED BEHAVIORAL HEALTH ASSESSMENT NOTE - DETAILS
EM provider aware denies has history of aggression, see HPI paternal side-mental illness; father and grandfather (and brother as per patient) have depression. father and some siblings have ETOH abuse and "serious" anger control issues pts mother

## 2020-08-19 NOTE — ED BEHAVIORAL HEALTH ASSESSMENT NOTE - PAST PSYCHOTROPIC MEDICATION
Abilify, Risperdal, Seroquel, haldol, cogentin, Zoloft (per chart: patient always gets on medications during Q 6 month hospitalizations, then becomes non-compliant)

## 2020-08-19 NOTE — ED BEHAVIORAL HEALTH ASSESSMENT NOTE - ORIENTATION OTHER
pt was initially unaware that he was in the hospital, believed he was in longterm. Patient is oriented to place during psych eval however does ask writer to arrest his mother

## 2020-08-19 NOTE — ED BEHAVIORAL HEALTH ASSESSMENT NOTE - DESCRIPTION
see BH note    Vital Signs Last 24 Hrs  T(C): 36.7 (18 Aug 2020 21:22), Max: 37.1 (18 Aug 2020 10:54)  T(F): 98.1 (18 Aug 2020 21:22), Max: 98.8 (18 Aug 2020 10:54)  HR: 79 (18 Aug 2020 21:22) (79 - 112)  BP: 112/77 (18 Aug 2020 21:22) (112/77 - 133/93)  BP(mean): --  RR: 20 (18 Aug 2020 21:22) (18 - 20)  SpO2: 98% (18 Aug 2020 21:22) (95% - 99%) denies. Patient chart pt was born prematurely See HPI. Graduated from [employer] (struggled somewhat)

## 2020-08-19 NOTE — ED BEHAVIORAL HEALTH ASSESSMENT NOTE - RISK ASSESSMENT
Moderate Acute Suicide Risk Patient has some protective factors including stability of domicile. However, he has multiple risk factors including medication non-compliance, male gender, history of past psychiatric hospitalizations, and severe anxiety 2/2 paranoia. He is currently at moderate to high risk of hurting himself and/or others at this time. He requires inpatient hospitalization in the context of this acutely elevated risk.
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
## 2020-08-19 NOTE — ED BEHAVIORAL HEALTH ASSESSMENT NOTE - VIOLENCE RISK FACTORS:
Irritability/Noncompliance with treatment/Feeling of being under threat and being unable to control threat/Lack of insight into violence risk/need for treatment/Impulsivity

## 2020-08-19 NOTE — CHART NOTE - NSCHARTNOTEFT_GEN_A_CORE
Screening Medical Evaluation  Patient Admitted from: [facility] ER    [facility] admitting diagnosis: Schizoaffective disorder, bipolar type    PAST MEDICAL & SURGICAL HISTORY:  Schizoaffective disorder  Bipolar affective  Anxiety  Pervasive developmental disorder  No significant past surgical history        Allergies    No Known Allergies    Intolerances        Social History: Denies smoking, alcohol use, drug use    FAMILY HISTORY:  No pertinent family history in first degree relatives      MEDICATIONS  (STANDING):  ARIPiprazole 10 milliGRAM(s) Oral at bedtime    MEDICATIONS  (PRN):  acetaminophen   Tablet .. 650 milliGRAM(s) Oral every 6 hours PRN Moderate Pain (4 - 6)  OLANZapine 5 milliGRAM(s) Oral every 4 hours PRN agitation  OLANZapine Injectable 7.5 milliGRAM(s) IntraMuscular once PRN severe agitation  polyethylene glycol 3350 17 Gram(s) Oral daily PRN constipation  traZODone 50 milliGRAM(s) Oral at bedtime PRN insomnia      Vital Signs Last 24 Hrs  T(C): 36.3 (19 Aug 2020 19:52), Max: 36.9 (19 Aug 2020 14:04)  T(F): 97.4 (19 Aug 2020 19:52), Max: 98.4 (19 Aug 2020 14:04)  HR: 67 (19 Aug 2020 12:52) (65 - 79)  BP: 117/78 (19 Aug 2020 12:52) (109/70 - 117/78)  BP(mean): --  RR: 18 (19 Aug 2020 12:52) (16 - 18)  SpO2: 98% (19 Aug 2020 12:52) (98% - 98%)  CAPILLARY BLOOD GLUCOSE            PHYSICAL EXAM:  GENERAL: NAD, well-developed  HEAD:  Atraumatic, Normocephalic  CHEST/LUNG: Clear to auscultation bilaterally; No wheeze  HEART: Regular rate and rhythm; No murmurs, rubs, or gallops  ABDOMEN: Refused  EXTREMITIES: Refused  PSYCH: AAOx3  SKIN: No rashes or lesions    LABS:                        16.0   9.07  )-----------( 206      ( 18 Aug 2020 12:41 )             48.3     08-18    138  |  102  |  15  ----------------------------<  113<H>  3.8   |  20<L>  |  0.90    Ca    10.0      18 Aug 2020 12:41    TPro  7.9  /  Alb  5.0  /  TBili  0.7  /  DBili  x   /  AST  21  /  ALT  27  /  AlkPhos  69  08-18          Urinalysis Basic - ( 18 Aug 2020 18:08 )    Color: Yellow / Appearance: Clear / S.030 / pH: x  Gluc: x / Ketone: Negative  / Bili: Negative / Urobili: Negative   Blood: x / Protein: Trace / Nitrite: Negative   Leuk Esterase: Negative / RBC: 1 /hpf / WBC 1 /HPF   Sq Epi: x / Non Sq Epi: 0 /hpf / Bacteria: Negative        RADIOLOGY & ADDITIONAL TESTS:    Assessment and Plan:  29 year old male with no significant PMHx presents to [facility] with an admitting diagnosis of Schizoaffective disorder, bipolar type. Pt has no medical complaints at this time including fevers, headache, dizziness, changes in vision, chest pain, SOB, abdominal pain, N/V/D/C, dysuria.     1. Schizoaffective disorder, bipolar type- follow plan as per primary team
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
## 2020-08-19 NOTE — CHART NOTE - NSCHARTNOTEFT_GEN_A_CORE
ED SW: Chart reviewed for inpatient psych transfer. Patient evaluated by Telepsych and held for re-assessment. Patient evaluated by [facility] and recommended for 2PC transfer. Admitted dx: Schizoaffective d/o. LCSW contacted [facility] for bed availability. Patient accepted and assigned to [address] [place] [address] with Dr. Francesco Love. ED MD reports patient is medically cleared and transfer disposition completed.  RN contacted [address] and provided RH handoff. [facility] contacted to schedule S discharge transportation.    Transfer packet completed to travel with patient. Prior-authorization pending.

## 2020-08-20 PROCEDURE — 99231 SBSQ HOSP IP/OBS SF/LOW 25: CPT

## 2020-08-20 RX ORDER — ARIPIPRAZOLE 15 MG/1
10 TABLET ORAL DAILY
Refills: 0 | Status: DISCONTINUED | OUTPATIENT
Start: 2020-08-21 | End: 2020-08-25

## 2020-08-20 RX ORDER — ARIPIPRAZOLE 15 MG/1
10 TABLET ORAL ONCE
Refills: 0 | Status: COMPLETED | OUTPATIENT
Start: 2020-08-20 | End: 2020-08-20

## 2020-08-20 RX ADMIN — ARIPIPRAZOLE 10 MILLIGRAM(S): 15 TABLET ORAL at 13:06

## 2020-08-21 PROCEDURE — 99231 SBSQ HOSP IP/OBS SF/LOW 25: CPT

## 2020-08-22 PROCEDURE — 99231 SBSQ HOSP IP/OBS SF/LOW 25: CPT

## 2020-08-22 RX ORDER — DIPHENHYDRAMINE HCL 50 MG
50 CAPSULE ORAL ONCE
Refills: 0 | Status: COMPLETED | OUTPATIENT
Start: 2020-08-22 | End: 2020-08-22

## 2020-08-22 RX ORDER — OLANZAPINE 15 MG/1
7.5 TABLET, FILM COATED ORAL ONCE
Refills: 0 | Status: COMPLETED | OUTPATIENT
Start: 2020-08-22 | End: 2020-08-22

## 2020-08-22 RX ORDER — HALOPERIDOL DECANOATE 100 MG/ML
5 INJECTION INTRAMUSCULAR ONCE
Refills: 0 | Status: COMPLETED | OUTPATIENT
Start: 2020-08-22 | End: 2020-08-22

## 2020-08-22 RX ADMIN — OLANZAPINE 7.5 MILLIGRAM(S): 15 TABLET, FILM COATED ORAL at 19:02

## 2020-08-22 RX ADMIN — Medication 50 MILLIGRAM(S): at 19:22

## 2020-08-22 RX ADMIN — Medication 50 MILLIGRAM(S): at 20:30

## 2020-08-22 RX ADMIN — HALOPERIDOL DECANOATE 5 MILLIGRAM(S): 100 INJECTION INTRAMUSCULAR at 19:22

## 2020-08-23 PROCEDURE — 99232 SBSQ HOSP IP/OBS MODERATE 35: CPT

## 2020-08-23 RX ORDER — HALOPERIDOL DECANOATE 100 MG/ML
7.5 INJECTION INTRAMUSCULAR EVERY 4 HOURS
Refills: 0 | Status: DISCONTINUED | OUTPATIENT
Start: 2020-08-23 | End: 2020-09-02

## 2020-08-23 RX ORDER — HALOPERIDOL DECANOATE 100 MG/ML
7.5 INJECTION INTRAMUSCULAR ONCE
Refills: 0 | Status: DISCONTINUED | OUTPATIENT
Start: 2020-08-23 | End: 2020-09-02

## 2020-08-23 RX ORDER — DIPHENHYDRAMINE HCL 50 MG
50 CAPSULE ORAL ONCE
Refills: 0 | Status: DISCONTINUED | OUTPATIENT
Start: 2020-08-23 | End: 2020-09-02

## 2020-08-23 RX ORDER — DIPHENHYDRAMINE HCL 50 MG
50 CAPSULE ORAL EVERY 4 HOURS
Refills: 0 | Status: DISCONTINUED | OUTPATIENT
Start: 2020-08-23 | End: 2020-09-02

## 2020-08-24 PROCEDURE — 99231 SBSQ HOSP IP/OBS SF/LOW 25: CPT

## 2020-08-24 RX ORDER — DIPHENHYDRAMINE HCL 50 MG
50 CAPSULE ORAL ONCE
Refills: 0 | Status: DISCONTINUED | OUTPATIENT
Start: 2020-08-24 | End: 2020-08-25

## 2020-08-24 RX ORDER — HALOPERIDOL DECANOATE 100 MG/ML
7.5 INJECTION INTRAMUSCULAR ONCE
Refills: 0 | Status: DISCONTINUED | OUTPATIENT
Start: 2020-08-24 | End: 2020-08-25

## 2020-08-24 RX ADMIN — Medication 50 MILLIGRAM(S): at 11:49

## 2020-08-24 RX ADMIN — Medication 4 MILLIGRAM(S): at 11:49

## 2020-08-24 RX ADMIN — HALOPERIDOL DECANOATE 7.5 MILLIGRAM(S): 100 INJECTION INTRAMUSCULAR at 11:49

## 2020-08-25 PROCEDURE — 99231 SBSQ HOSP IP/OBS SF/LOW 25: CPT

## 2020-08-25 RX ORDER — ARIPIPRAZOLE 15 MG/1
15 TABLET ORAL DAILY
Refills: 0 | Status: DISCONTINUED | OUTPATIENT
Start: 2020-08-26 | End: 2020-08-27

## 2020-08-25 RX ADMIN — HALOPERIDOL DECANOATE 7.5 MILLIGRAM(S): 100 INJECTION INTRAMUSCULAR at 11:22

## 2020-08-25 RX ADMIN — ARIPIPRAZOLE 10 MILLIGRAM(S): 15 TABLET ORAL at 10:27

## 2020-08-25 RX ADMIN — Medication 3 MILLIGRAM(S): at 11:22

## 2020-08-25 RX ADMIN — Medication 50 MILLIGRAM(S): at 11:22

## 2020-08-26 PROCEDURE — 99231 SBSQ HOSP IP/OBS SF/LOW 25: CPT

## 2020-08-26 RX ADMIN — Medication 3 MILLIGRAM(S): at 08:10

## 2020-08-26 RX ADMIN — ARIPIPRAZOLE 15 MILLIGRAM(S): 15 TABLET ORAL at 08:09

## 2020-08-27 PROCEDURE — 99231 SBSQ HOSP IP/OBS SF/LOW 25: CPT

## 2020-08-27 RX ORDER — ARIPIPRAZOLE 15 MG/1
20 TABLET ORAL DAILY
Refills: 0 | Status: DISCONTINUED | OUTPATIENT
Start: 2020-08-28 | End: 2020-09-02

## 2020-08-27 RX ADMIN — ARIPIPRAZOLE 15 MILLIGRAM(S): 15 TABLET ORAL at 08:21

## 2020-08-28 PROCEDURE — 99231 SBSQ HOSP IP/OBS SF/LOW 25: CPT

## 2020-08-28 RX ADMIN — ARIPIPRAZOLE 20 MILLIGRAM(S): 15 TABLET ORAL at 08:35

## 2020-08-29 RX ADMIN — ARIPIPRAZOLE 20 MILLIGRAM(S): 15 TABLET ORAL at 08:41

## 2020-08-30 RX ADMIN — ARIPIPRAZOLE 20 MILLIGRAM(S): 15 TABLET ORAL at 08:37

## 2020-08-31 PROCEDURE — 99231 SBSQ HOSP IP/OBS SF/LOW 25: CPT

## 2020-08-31 RX ORDER — ARIPIPRAZOLE 15 MG/1
400 TABLET ORAL ONCE
Refills: 0 | Status: COMPLETED | OUTPATIENT
Start: 2020-08-31 | End: 2020-08-31

## 2020-08-31 RX ADMIN — ARIPIPRAZOLE 20 MILLIGRAM(S): 15 TABLET ORAL at 09:03

## 2020-08-31 RX ADMIN — ARIPIPRAZOLE 400 MILLIGRAM(S): 15 TABLET ORAL at 09:58

## 2020-09-01 PROCEDURE — 99231 SBSQ HOSP IP/OBS SF/LOW 25: CPT

## 2020-09-01 RX ADMIN — ARIPIPRAZOLE 20 MILLIGRAM(S): 15 TABLET ORAL at 09:55

## 2020-09-02 VITALS — TEMPERATURE: 97 F

## 2020-09-02 PROCEDURE — 99239 HOSP IP/OBS DSCHRG MGMT >30: CPT

## 2020-09-02 RX ORDER — ARIPIPRAZOLE 15 MG/1
1 TABLET ORAL
Qty: 0 | Refills: 0 | DISCHARGE
Start: 2020-09-02

## 2020-09-02 RX ORDER — ARIPIPRAZOLE 15 MG/1
1 TABLET ORAL
Qty: 15 | Refills: 0
Start: 2020-09-02 | End: 2020-09-16

## 2020-09-02 RX ADMIN — ARIPIPRAZOLE 20 MILLIGRAM(S): 15 TABLET ORAL at 08:12

## 2021-01-27 ENCOUNTER — EMERGENCY (EMERGENCY)
Facility: HOSPITAL | Age: 31
LOS: 1 days | Discharge: HOME | End: 2021-01-27
Attending: STUDENT IN AN ORGANIZED HEALTH CARE EDUCATION/TRAINING PROGRAM
Payer: MEDICAID

## 2021-01-27 VITALS — HEIGHT: 74 IN

## 2021-01-27 PROBLEM — F25.9 SCHIZOAFFECTIVE DISORDER, UNSPECIFIED: Chronic | Status: ACTIVE | Noted: 2020-08-18

## 2021-01-27 PROBLEM — F31.9 BIPOLAR DISORDER, UNSPECIFIED: Chronic | Status: ACTIVE | Noted: 2020-08-18

## 2021-01-27 LAB
ALBUMIN SERPL ELPH-MCNC: 4.7 G/DL — SIGNIFICANT CHANGE UP (ref 3.3–5)
ALP SERPL-CCNC: 75 U/L — SIGNIFICANT CHANGE UP (ref 40–120)
ALT FLD-CCNC: 30 U/L — SIGNIFICANT CHANGE UP (ref 10–45)
AMPHET UR-MCNC: NEGATIVE — SIGNIFICANT CHANGE UP
ANION GAP SERPL CALC-SCNC: 16 MMOL/L — SIGNIFICANT CHANGE UP (ref 5–17)
APAP SERPL-MCNC: <15 UG/ML — SIGNIFICANT CHANGE UP (ref 10–30)
APPEARANCE UR: CLEAR — SIGNIFICANT CHANGE UP
AST SERPL-CCNC: 20 U/L — SIGNIFICANT CHANGE UP (ref 10–40)
BACTERIA # UR AUTO: NEGATIVE — SIGNIFICANT CHANGE UP
BARBITURATES UR SCN-MCNC: NEGATIVE — SIGNIFICANT CHANGE UP
BASOPHILS # BLD AUTO: 0.06 K/UL — SIGNIFICANT CHANGE UP (ref 0–0.2)
BASOPHILS NFR BLD AUTO: 0.5 % — SIGNIFICANT CHANGE UP (ref 0–2)
BENZODIAZ UR-MCNC: NEGATIVE — SIGNIFICANT CHANGE UP
BILIRUB SERPL-MCNC: 0.4 MG/DL — SIGNIFICANT CHANGE UP (ref 0.2–1.2)
BILIRUB UR-MCNC: NEGATIVE — SIGNIFICANT CHANGE UP
BUN SERPL-MCNC: 13 MG/DL — SIGNIFICANT CHANGE UP (ref 7–23)
CALCIUM SERPL-MCNC: 10 MG/DL — SIGNIFICANT CHANGE UP (ref 8.4–10.5)
CHLORIDE SERPL-SCNC: 101 MMOL/L — SIGNIFICANT CHANGE UP (ref 96–108)
CO2 SERPL-SCNC: 21 MMOL/L — LOW (ref 22–31)
COCAINE METAB.OTHER UR-MCNC: NEGATIVE — SIGNIFICANT CHANGE UP
COLOR SPEC: YELLOW — SIGNIFICANT CHANGE UP
CREAT SERPL-MCNC: 0.85 MG/DL — SIGNIFICANT CHANGE UP (ref 0.5–1.3)
DIFF PNL FLD: NEGATIVE — SIGNIFICANT CHANGE UP
EOSINOPHIL # BLD AUTO: 0.15 K/UL — SIGNIFICANT CHANGE UP (ref 0–0.5)
EOSINOPHIL NFR BLD AUTO: 1.4 % — SIGNIFICANT CHANGE UP (ref 0–6)
EPI CELLS # UR: 1 /HPF — SIGNIFICANT CHANGE UP
ETHANOL SERPL-MCNC: SIGNIFICANT CHANGE UP MG/DL (ref 0–10)
GLUCOSE SERPL-MCNC: 93 MG/DL — SIGNIFICANT CHANGE UP (ref 70–99)
GLUCOSE UR QL: NEGATIVE — SIGNIFICANT CHANGE UP
HCT VFR BLD CALC: 51 % — HIGH (ref 39–50)
HGB BLD-MCNC: 16.7 G/DL — SIGNIFICANT CHANGE UP (ref 13–17)
HYALINE CASTS # UR AUTO: 5 /LPF — HIGH (ref 0–2)
IMM GRANULOCYTES NFR BLD AUTO: 1.2 % — SIGNIFICANT CHANGE UP (ref 0–1.5)
KETONES UR-MCNC: NEGATIVE — SIGNIFICANT CHANGE UP
LEUKOCYTE ESTERASE UR-ACNC: NEGATIVE — SIGNIFICANT CHANGE UP
LYMPHOCYTES # BLD AUTO: 2.52 K/UL — SIGNIFICANT CHANGE UP (ref 1–3.3)
LYMPHOCYTES # BLD AUTO: 22.9 % — SIGNIFICANT CHANGE UP (ref 13–44)
MCHC RBC-ENTMCNC: 29.7 PG — SIGNIFICANT CHANGE UP (ref 27–34)
MCHC RBC-ENTMCNC: 32.7 GM/DL — SIGNIFICANT CHANGE UP (ref 32–36)
MCV RBC AUTO: 90.7 FL — SIGNIFICANT CHANGE UP (ref 80–100)
METHADONE UR-MCNC: NEGATIVE — SIGNIFICANT CHANGE UP
MONOCYTES # BLD AUTO: 0.72 K/UL — SIGNIFICANT CHANGE UP (ref 0–0.9)
MONOCYTES NFR BLD AUTO: 6.5 % — SIGNIFICANT CHANGE UP (ref 2–14)
NEUTROPHILS # BLD AUTO: 7.42 K/UL — HIGH (ref 1.8–7.4)
NEUTROPHILS NFR BLD AUTO: 67.5 % — SIGNIFICANT CHANGE UP (ref 43–77)
NITRITE UR-MCNC: NEGATIVE — SIGNIFICANT CHANGE UP
NRBC # BLD: 0 /100 WBCS — SIGNIFICANT CHANGE UP (ref 0–0)
OPIATES UR-MCNC: NEGATIVE — SIGNIFICANT CHANGE UP
OXYCODONE UR-MCNC: NEGATIVE — SIGNIFICANT CHANGE UP
PCP SPEC-MCNC: SIGNIFICANT CHANGE UP
PCP UR-MCNC: NEGATIVE — SIGNIFICANT CHANGE UP
PH UR: 6.5 — SIGNIFICANT CHANGE UP (ref 5–8)
PLATELET # BLD AUTO: 216 K/UL — SIGNIFICANT CHANGE UP (ref 150–400)
POTASSIUM SERPL-MCNC: 3.9 MMOL/L — SIGNIFICANT CHANGE UP (ref 3.5–5.3)
POTASSIUM SERPL-SCNC: 3.9 MMOL/L — SIGNIFICANT CHANGE UP (ref 3.5–5.3)
PROT SERPL-MCNC: 8 G/DL — SIGNIFICANT CHANGE UP (ref 6–8.3)
PROT UR-MCNC: ABNORMAL
RBC # BLD: 5.62 M/UL — SIGNIFICANT CHANGE UP (ref 4.2–5.8)
RBC # FLD: 11.7 % — SIGNIFICANT CHANGE UP (ref 10.3–14.5)
RBC CASTS # UR COMP ASSIST: 3 /HPF — SIGNIFICANT CHANGE UP (ref 0–4)
SALICYLATES SERPL-MCNC: <2 MG/DL — LOW (ref 15–30)
SARS-COV-2 RNA SPEC QL NAA+PROBE: SIGNIFICANT CHANGE UP
SODIUM SERPL-SCNC: 138 MMOL/L — SIGNIFICANT CHANGE UP (ref 135–145)
SP GR SPEC: 1.03 — HIGH (ref 1.01–1.02)
THC UR QL: NEGATIVE — SIGNIFICANT CHANGE UP
UROBILINOGEN FLD QL: NEGATIVE — SIGNIFICANT CHANGE UP
WBC # BLD: 11 K/UL — HIGH (ref 3.8–10.5)
WBC # FLD AUTO: 11 K/UL — HIGH (ref 3.8–10.5)
WBC UR QL: 1 /HPF — SIGNIFICANT CHANGE UP (ref 0–5)

## 2021-01-27 PROCEDURE — 90792 PSYCH DIAG EVAL W/MED SRVCS: CPT

## 2021-01-27 PROCEDURE — 93010 ELECTROCARDIOGRAM REPORT: CPT

## 2021-01-27 PROCEDURE — 99285 EMERGENCY DEPT VISIT HI MDM: CPT

## 2021-01-27 RX ORDER — HALOPERIDOL DECANOATE 100 MG/ML
5 INJECTION INTRAMUSCULAR ONCE
Refills: 0 | Status: COMPLETED | OUTPATIENT
Start: 2021-01-27 | End: 2021-01-27

## 2021-01-27 RX ADMIN — Medication 2 MILLIGRAM(S): at 17:14

## 2021-01-27 RX ADMIN — HALOPERIDOL DECANOATE 5 MILLIGRAM(S): 100 INJECTION INTRAMUSCULAR at 17:12

## 2021-01-27 NOTE — ED BEHAVIORAL HEALTH ASSESSMENT NOTE - DETAILS
EM provider aware pts mother father and grandfather with paranoia has history of aggression, homicidal threats to mother, see HPI denies

## 2021-01-27 NOTE — ED PROVIDER NOTE - CLINICAL SUMMARY MEDICAL DECISION MAKING FREE TEXT BOX
30M presenting for violent behavior, here denies AVH/SI/HI but concerning collateral, initially refusing labs/ekg, emergent psychiatric evaluation and recommendations much appreciated, with collateral history elevated concern for safety of others, will medicate, draw labs / ekg, dispo pending psychiatry re-evaluation though likely will necessitate inpatient management.

## 2021-01-27 NOTE — ED BEHAVIORAL HEALTH ASSESSMENT NOTE - VIOLENCE RISK FACTORS:
Feeling of being under threat and being unable to control threat/Affective dysregulation/Impulsivity/Lack of insight into violence risk/need for treatment/Noncompliance with treatment/Irritability

## 2021-01-27 NOTE — ED BEHAVIORAL HEALTH ASSESSMENT NOTE - NSSUICRSKFACTOR_PSY_ALL_CORE
Current and Past Psychiatric Diagnoses/Presenting Symptoms/Historical Factors/Treatment Related Factors

## 2021-01-27 NOTE — ED PROVIDER NOTE - SHIFT CHANGE DETAILS
pt signed out to me, sleeping - pt received haldol/ativan at 1712; seen by psychiatry who would like psych hold overnight and re-eval in AM.

## 2021-01-27 NOTE — ED BEHAVIORAL HEALTH ASSESSMENT NOTE - OTHER PAST PSYCHIATRIC HISTORY (INCLUDE DETAILS REGARDING ONSET, COURSE OF ILLNESS, INPATIENT/OUTPATIENT TREATMENT)
[facility] admissions, noncompliance    Per chart: "Patient was diagnosed with Asperger's Syndrome when 9 y/o and saw a therapist through his teens for improving socialization and reading. His behavior started to worsen at age 17 when he stopped therapy. No history of suicide attempts. History of aggression toward mother and brother".

## 2021-01-27 NOTE — ED BEHAVIORAL HEALTH NOTE - BEHAVIORAL HEALTH NOTE
At 1712H pt received ativan 2 mg with Haldol 2 mg IM for agitation. Pt was demanding to leave the hospital and refused all diagnostic procedures and medical evaluation and when staff addressed him and explained evaluation process, pt became threatening and said he will punch who ever gives him medications. Pt was verbally abusive to staff and EMS on his arrival. Pt slept after 1/2 hr  of medication  administration and was compliant with all diagnostic procedures. Pt will stay in the ER for reassessment in the morning

## 2021-01-27 NOTE — ED ADULT NURSE NOTE - OBJECTIVE STATEMENT
30 year old male BIB EMS due to aggression at home.  Pt with history of psychiatric  hospitalization and has been non com[pliant with medications. Pt denied suicidal and homicidal ideations. Nursing assessment cannot be completed because pt refused to comply. He refused to be gowned and searched, refused blood work  and becomes verbally abusive when given instructions. He was placed on 1:1 for safety. Pt re 30 year old male BIB EMS due to aggression at home.  Pt with history of Schizoaffective D/O, psychiatric  hospitalizations and non compliance with medications. Pt denied current suicidal and homicidal ideations. Nursing assessment cannot be completed because pt refused to comply and instead repeatedly asking to be discharged. He refused to be gowned and searched, refused blood work  and becomes verbally abusive when given instructions. He was placed on 1:1 for safety.

## 2021-01-27 NOTE — ED PROVIDER NOTE - PHYSICAL EXAMINATION
Gen: NAD, non-toxic, conversational  Eyes: PERRLA, EOMI   HENT: Normocephalic, atraumatic. External ears normal, no rhinorrhea, moist mucous membranes.   CV: RRR, no M/R/G  Resp: CTAB, non-labored, speaking without difficulty on room air  Abd: soft, non tender, non rigid, no guarding or rebound tenderness  Back: No CVAT bilaterally, no midline ttp  Skin: dry, wwp   Neuro: AOx3, speech is fluent and appropriate  Psych: Mood elevated
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
## 2021-01-27 NOTE — ED BEHAVIORAL HEALTH ASSESSMENT NOTE - SUMMARY
30M single, noncaregiver, domiciled alone in apartment in mother's home, unemployed, graduate of [employer] (history degree, 2014), with PPhx of schizoaffective disorder, PDD, denies suicide attempts, multiple previous inpatient psychiatric hospitalizations, noncompliance, in OP psych tx with Dr. Jay (has not seen in months), no substance abuse, no significant PMH, BIBEMS activated by pt's mother for aggression in the context of med noncompliance of 3 months, psychiatry consulted for evaluation.

## 2021-01-27 NOTE — ED BEHAVIORAL HEALTH ASSESSMENT NOTE - RISK ASSESSMENT
Patient has some protective factors including stability of domicile, family support. However, he has multiple risk factors including medication non-compliance, male gender, history of past psychiatric hospitalizations, and severe anxiety 2/2 paranoia, homicidal threats.  Pt is at elevated risk of harm. Moderate Acute Suicide Risk

## 2021-01-27 NOTE — ED BEHAVIORAL HEALTH ASSESSMENT NOTE - DESCRIPTION
ICU Vital Signs Last 24 Hrs  T(C): 36.8 (27 Jan 2021 17:14), Max: 36.8 (27 Jan 2021 17:14)  T(F): 98.2 (27 Jan 2021 17:14), Max: 98.2 (27 Jan 2021 17:14)  HR: 74 (27 Jan 2021 17:14) (74 - 74)  BP: 117/80 (27 Jan 2021 17:14) (117/80 - 117/80)  BP(mean): --  ABP: --  ABP(mean): --  RR: 16 (27 Jan 2021 17:14) (16 - 16)  SpO2: 96% (27 Jan 2021 17:14) (96% - 96%) denies. Patient chart pt was born prematurely See HPI. Graduated from [employer] (struggled somewhat)
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
## 2021-01-27 NOTE — ED PROVIDER NOTE - PROGRESS NOTE DETAILS
Burch DO: pt has been ambulating to the bathroom and back without difficulty, well appearing, no acute complaints, no issues overnight- received call from tele psych that pt will be reassessed in AM, can give haldol/ativan prn agitation and consider risperdal 1mg bid but will be seen by psych team in AM. Pt sleeping, will hold on meds until he is awake. Dr. Dutton Note: s/o from night team pending psych evaluation.  Psych evaluated patient, pt still deemed risk to society, will 2-PC, agree with psychiatrist assessment after evaluating patient and filled out 2nd physician legal form, awaiting inpt psych bed availability, will give oral medications for escalating but re-directable agitation and paranoia behaviour.

## 2021-01-27 NOTE — ED BEHAVIORAL HEALTH ASSESSMENT NOTE - PSYCHIATRIC ISSUES AND PLAN (INCLUDE STANDING AND PRN MEDICATION)
PRN: Haldol 5mg PO/IM q6h PRN agitation, Benadryl 50mg PO/IM q6h PRN EPS ppx, Ativan 2mg PO/IM q6h PRN agitation

## 2021-01-27 NOTE — ED BEHAVIORAL HEALTH ASSESSMENT NOTE - OTHER
see above mother tenuous guarded, minimally cooperative feleciaa denies, but responding to internal stimuli per ED Staff vague

## 2021-01-27 NOTE — ED ADULT NURSE REASSESSMENT NOTE - NS ED NURSE REASSESS COMMENT FT1
pt. has been sleeping and no further episodes of aggressive behavior since he was medicated. 1:1 CO for aggression maintained.

## 2021-01-28 VITALS
TEMPERATURE: 97 F | HEART RATE: 78 BPM | OXYGEN SATURATION: 98 % | DIASTOLIC BLOOD PRESSURE: 81 MMHG | SYSTOLIC BLOOD PRESSURE: 138 MMHG | RESPIRATION RATE: 18 BRPM

## 2021-01-28 LAB — TSH SERPL-MCNC: 0.89 UIU/ML — SIGNIFICANT CHANGE UP (ref 0.27–4.2)

## 2021-01-28 PROCEDURE — 99285 EMERGENCY DEPT VISIT HI MDM: CPT | Mod: 25

## 2021-01-28 PROCEDURE — 96372 THER/PROPH/DIAG INJ SC/IM: CPT

## 2021-01-28 PROCEDURE — 80307 DRUG TEST PRSMV CHEM ANLYZR: CPT

## 2021-01-28 PROCEDURE — U0003: CPT

## 2021-01-28 PROCEDURE — 81001 URINALYSIS AUTO W/SCOPE: CPT

## 2021-01-28 PROCEDURE — U0005: CPT

## 2021-01-28 PROCEDURE — 80053 COMPREHEN METABOLIC PANEL: CPT

## 2021-01-28 PROCEDURE — 84443 ASSAY THYROID STIM HORMONE: CPT

## 2021-01-28 PROCEDURE — 93005 ELECTROCARDIOGRAM TRACING: CPT

## 2021-01-28 PROCEDURE — 85025 COMPLETE CBC W/AUTO DIFF WBC: CPT

## 2021-01-28 RX ORDER — RISPERIDONE 4 MG/1
1 TABLET ORAL ONCE
Refills: 0 | Status: COMPLETED | OUTPATIENT
Start: 2021-01-28 | End: 2021-01-28

## 2021-01-28 RX ORDER — DIPHENHYDRAMINE HCL 50 MG
25 CAPSULE ORAL ONCE
Refills: 0 | Status: COMPLETED | OUTPATIENT
Start: 2021-01-28 | End: 2021-01-28

## 2021-01-28 RX ADMIN — Medication 25 MILLIGRAM(S): at 11:21

## 2021-01-28 RX ADMIN — RISPERIDONE 1 MILLIGRAM(S): 4 TABLET ORAL at 11:18

## 2021-01-28 RX ADMIN — Medication 2 MILLIGRAM(S): at 11:19

## 2021-01-28 NOTE — ED BEHAVIORAL HEALTH NOTE - BEHAVIORAL HEALTH NOTE
31yo reportedly domiciled w/ mother M w/ reported hx of schizoaffective d/o, bipolar type who presented to ED after mother called 911 for increased aggression, violent threats in context of med nonadherence. Patient had been evaluated by ED psychiatrist and held for re-assessment pending med clearance.       Pt was attempted to be re-assessed but remained asleep and unable to engage in assessment.    collateral from nursing staff: received IM haldol 5mg, ativan 2mg at 1642; no further incidents.    collateral from ED attending: pt is medically cleared         MSE: asleep; unable to assess remainder     Diagnosis: by hx: schizoaffective      Assessment and plan: pt w/ reported hx of schizoaffective d/o, bipolar subtype w/ reported increased aggression and violent threats in context of reported non adherence. Patient is asleep and unable to engage in interview. Patient will be re-assessed and psych dispo to be determined pending re-assessment. If he remains symptomatic, will need to consider inpatient psych hospitalization.      1) Start risperdal 1mg po bid  2) PRN: haldol 5mg po/IM q6hr prn agitation, ativan 2mg po/IM q6hr prn agitation  3) Elopement precaution, keep 1:1  4) Psych to follow - will need re-assessment prior to psych dispo decision.

## 2021-01-28 NOTE — PROGRESS NOTE BEHAVIORAL HEALTH - NSBHCHARTREVIEWINVESTIGATE_PSY_A_CORE FT
< from: 12 Lead ECG (08.18.20 @ 12:31) >      Ventricular Rate 86 BPM    Atrial Rate 86 BPM    P-R Interval 158 ms    QRS Duration 102 ms    Q-T Interval 368 ms    QTC Calculation(Bezet) 440 ms    P Axis 44 degrees    R Axis 41 degrees    T Axis 22 degrees    Diagnosis Line NORMAL SINUS RHYTHM  NORMAL ECG  NO PREVIOUS ECGS AVAILABLE  Confirmed by MD REJI, MIN ([id number]) on 8/19/2020 10:27:58 PM    < end of copied text >
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
## 2021-01-28 NOTE — PROGRESS NOTE BEHAVIORAL HEALTH - NSBHCHARTREVIEWVS_PSY_A_CORE FT
T(C): 36.3 (01-28-21 @ 06:00), Max: 36.8 (01-27-21 @ 17:14)  HR: 78 (01-28-21 @ 06:00) (65 - 78)  BP: 138/81 (01-28-21 @ 06:00) (117/80 - 145/82)  RR: 18 (01-28-21 @ 06:00) (16 - 18)  SpO2: 98% (01-28-21 @ 06:00) (96% - 99%)  Wt(kg): --

## 2021-01-28 NOTE — CHART NOTE - NSCHARTNOTEFT_GEN_A_CORE
ED SW: Chart reviewed for transfer to inpatient psych. SEE  NOTE. Patient evaluated by [facility] Psych on 1/27 and held in ED for medical clearance. Patient re-evaluated on 1/28 and recommended for 2PC transfer to inpatient psych. Admitting dx: Schizoaffective d/o, Bipolar type.    LCSW attempted transfer to [facility]- no beds available. [facility] reports bed availability. Review conducted via Gay. Copy of EKG faxed to [facility] Admissions [phone]. Patient accepted and assigned to SE 2. Per [facility] Gracy, no RN handoff required. Patient to admit to [facility] Sharlene Rosario. Transfer packet completed. Patient medically cleared; transfer disposition completed by ED MD.  RN contacted [facility] to arrange S transport. LCSW contacted patient's mother, Shanel (ph. [phone]) to provide transfer details. [facility] contact information provided to patient's mother. Patient awaiting transport at this time. Inpatient mental health pre-certification as follows:    Insurance: [employer]  Policy #: [id number]  Days approved: 1/28-2/3  AUTH: [id number]  Next review: 2/3 with Shanel (ph. [phone] ext. [phone])

## 2021-01-28 NOTE — PROGRESS NOTE BEHAVIORAL HEALTH - SUMMARY
30M single, noncaregiver, domiciled alone in apartment in mother's home, unemployed, graduate of [employer] (history degree, 2014), with PPhx of schizoaffective disorder, PDD, denies suicide attempts, multiple previous inpatient psychiatric hospitalizations, noncompliance, in OP psych tx with Dr. Jay (has not seen in months), no substance abuse, no significant PMH, BIBEMS activated by pt's mother for aggression in the context of med noncompliance of 3 months, psychiatry consulted for evaluation.  Pt paranoid, guarded, irritable, and impulsive, making homicidal threats to mother, noncompliant with medications, family concerned for safety.  Required PRN in ER for agitation.  Will admit to inpt psychiatry, 2PC legal status.

## 2021-01-28 NOTE — PROGRESS NOTE BEHAVIORAL HEALTH - OTHER
feleciaa tenuous denies, but responding to internal stimuli per ED Staff guarded, minimally cooperative vague

## 2021-01-28 NOTE — PROGRESS NOTE BEHAVIORAL HEALTH - RISK ASSESSMENT
Patient has some protective factors including stability of domicile, family support. However, he has multiple risk factors including medication non-compliance, male gender, history of past psychiatric hospitalizations, and severe anxiety 2/2 paranoia, homicidal threats.  Pt is at acutely elevated risk of harm.

## 2021-01-28 NOTE — PROGRESS NOTE BEHAVIORAL HEALTH - NSBHCONSULTMEDS_PSY_A_CORE FT
Admit to inpt psychiatry 9.27 legal status    Consider restarting Abilify ROBERSON as inpatient    PRN: Haldol 5mg PO/IM q6h PRN agitation, Benadryl 50mg PO/IM q6h PRN EPS ppx, Ativan 2mg PO/IM q6h PRN agitation

## 2021-01-28 NOTE — PROGRESS NOTE BEHAVIORAL HEALTH - NSBHCHARTREVIEWLAB_PSY_A_CORE FT
16.7   11.00 )-----------( 216      ( 2021 17:45 )             51.0         138  |  101  |  13  ----------------------------<  93  3.9   |  21<L>  |  0.85    Ca    10.0      2021 17:45    TPro  8.0  /  Alb  4.7  /  TBili  0.4  /  DBili  x   /  AST  20  /  ALT  30  /  AlkPhos  75      Urinalysis Basic - ( 2021 18:46 )    Color: Yellow / Appearance: Clear / S.027 / pH: x  Gluc: x / Ketone: Negative  / Bili: Negative / Urobili: Negative   Blood: x / Protein: Trace / Nitrite: Negative   Leuk Esterase: Negative / RBC: 3 /hpf / WBC 1 /HPF   Sq Epi: x / Non Sq Epi: 1 /hpf / Bacteria: Negative

## 2021-05-27 NOTE — ED BEHAVIORAL HEALTH ASSESSMENT NOTE - NS ED BHA REVIEW OF ED CHART AVAILABLE IMAGING REVIEWED
SW Following  For Discharge Planning      SW received call from Leonid, admissions liason from [facility] and was informed that pt's daughter contacted [facility] and informed them that they would take pt out immediatley from [facility] if he is transferred there. SW was informed that [facility] can no longer accept pt.     SW informed Edna Valdez, Care Management Manager.     Discharge cancelled.   SW informed Dr. Peters via phone to cancel discharge.     SW to continue to follow for discharge planning needs pending further medical management.         Chanel Bennett, [facility]  Medical Social Worker   ( phone)   ( pager)   None available

## 2022-02-27 NOTE — ED BEHAVIORAL HEALTH ASSESSMENT NOTE - AXIS IV
Patient Instructions: Today you were seen in the emergency department for chest pain  We reviewed your EKG, CXR and your labs and determined that you would be able to be discharged  You should take ibuprofen and tylenol as needed for your pain  You should follow up with your primary care doctor/cardiologist     Please return to the emergency department if your symptoms get worse, you develop a fever, or you have any other symptoms we discussed today prior to discharge  Nice to meet you! Best of luck with everything!
Problem related to social environment/Problems with primary support

## 2023-01-20 NOTE — ED ADULT NURSE NOTE - NS ED NURSE LEVEL OF CONSCIOUSNESS ORIENTATION
PATIENT CONFIRMED TESTING AND APPOINTMENT WITH KATHERINE ON 1/23/23   Oriented - self; Oriented - place; Oriented - time

## 2023-07-06 NOTE — ED BEHAVIORAL HEALTH ASSESSMENT NOTE - OTHER PAST PSYCHIATRIC HISTORY (INCLUDE DETAILS REGARDING ONSET, COURSE OF ILLNESS, INPATIENT/OUTPATIENT TREATMENT)
See HPI. Patient is not currently in outpatient psych treatment.    Per chart: "Patient was diagnosed with Asperger's Syndrome when 9 y/o and saw a therapist through his teens for improving socialization and reading. His behavior started to worsen at age 17 when he stopped therapy. No history of suicide attempts. History of aggression toward mother and brother". Rinvoq Pregnancy And Lactation Text: Based on animal studies, Rinvoq may cause embryo-fetal harm when administered to pregnant women.  The medication should not be used in pregnancy.  Breastfeeding is not recommended during treatment and for 6 days after the last dose.

## 2024-02-02 NOTE — ED BEHAVIORAL HEALTH ASSESSMENT NOTE - MODE OF ARRIVAL
02/02/24 1519   Plan   Final Discharge Disposition Code 01 - home or self-care   Final Note Pt to discharge back to [facility] on Saturday. SW has notified [employer] and son. Son states he will be providing transportation. Son will need to be contacted when pt is ready to picked up.        EMT / Paramedic
